# Patient Record
Sex: MALE | Race: BLACK OR AFRICAN AMERICAN | NOT HISPANIC OR LATINO | Employment: FULL TIME | URBAN - METROPOLITAN AREA
[De-identification: names, ages, dates, MRNs, and addresses within clinical notes are randomized per-mention and may not be internally consistent; named-entity substitution may affect disease eponyms.]

---

## 2023-03-21 PROBLEM — R05.9 COUGH: Status: ACTIVE | Noted: 2023-03-21

## 2023-03-21 PROBLEM — E66.01 CLASS 3 SEVERE OBESITY DUE TO EXCESS CALORIES IN ADULT (MULTI): Status: ACTIVE | Noted: 2023-03-21

## 2023-03-21 PROBLEM — M54.2 CERVICALGIA: Status: ACTIVE | Noted: 2023-03-21

## 2023-03-21 PROBLEM — E11.9 TYPE 2 DIABETES MELLITUS (MULTI): Status: ACTIVE | Noted: 2023-03-21

## 2023-03-21 PROBLEM — K56.609 SMALL BOWEL OBSTRUCTION (MULTI): Status: ACTIVE | Noted: 2023-03-21

## 2023-03-21 PROBLEM — M72.2 PLANTAR FASCIITIS, RIGHT: Status: ACTIVE | Noted: 2023-03-21

## 2023-03-21 PROBLEM — M25.532 WRIST PAIN, CHRONIC, LEFT: Status: ACTIVE | Noted: 2023-03-21

## 2023-03-21 PROBLEM — G47.30 SLEEP APNEA: Status: ACTIVE | Noted: 2023-03-21

## 2023-03-21 PROBLEM — J30.9 ALLERGIC RHINITIS: Status: ACTIVE | Noted: 2023-03-21

## 2023-03-21 PROBLEM — J38.7 LARYNGEAL CYST: Status: ACTIVE | Noted: 2023-03-21

## 2023-03-21 PROBLEM — E55.9 VITAMIN D DEFICIENCY: Status: ACTIVE | Noted: 2023-03-21

## 2023-03-21 PROBLEM — R19.7 NAUSEA VOMITING AND DIARRHEA: Status: ACTIVE | Noted: 2023-03-21

## 2023-03-21 PROBLEM — R60.9 EDEMA: Status: ACTIVE | Noted: 2023-03-21

## 2023-03-21 PROBLEM — G89.29 WRIST PAIN, CHRONIC, LEFT: Status: ACTIVE | Noted: 2023-03-21

## 2023-03-21 PROBLEM — N47.6 BALANOPOSTHITIS: Status: ACTIVE | Noted: 2023-03-21

## 2023-03-21 PROBLEM — R22.1 NECK SWELLING: Status: ACTIVE | Noted: 2023-03-21

## 2023-03-21 PROBLEM — I10 HYPERTENSION: Status: ACTIVE | Noted: 2023-03-21

## 2023-03-21 PROBLEM — E66.813 CLASS 3 SEVERE OBESITY DUE TO EXCESS CALORIES IN ADULT: Status: ACTIVE | Noted: 2023-03-21

## 2023-03-21 PROBLEM — E11.9 DIABETES MELLITUS (MULTI): Status: ACTIVE | Noted: 2023-03-21

## 2023-03-21 PROBLEM — U07.1 COVID-19 VIRUS INFECTION: Status: ACTIVE | Noted: 2023-03-21

## 2023-03-21 PROBLEM — E66.09 EXOGENOUS OBESITY: Status: ACTIVE | Noted: 2023-03-21

## 2023-03-21 PROBLEM — G47.33 OBSTRUCTIVE SLEEP APNEA: Status: ACTIVE | Noted: 2023-03-21

## 2023-03-21 PROBLEM — M21.40 PES PLANUS: Status: ACTIVE | Noted: 2023-03-21

## 2023-03-21 PROBLEM — R21 RASH: Status: ACTIVE | Noted: 2023-03-21

## 2023-03-21 PROBLEM — E78.5 DYSLIPIDEMIA: Status: ACTIVE | Noted: 2023-03-21

## 2023-03-21 PROBLEM — R93.2 ABNORMAL CT OF LIVER: Status: ACTIVE | Noted: 2023-03-21

## 2023-03-21 PROBLEM — R11.2 NAUSEA VOMITING AND DIARRHEA: Status: ACTIVE | Noted: 2023-03-21

## 2023-03-21 RX ORDER — TRIAMTERENE AND HYDROCHLOROTHIAZIDE 37.5; 25 MG/1; MG/1
1 CAPSULE ORAL DAILY
COMMUNITY
Start: 2020-12-15 | End: 2023-04-03 | Stop reason: SDUPTHER

## 2023-03-21 RX ORDER — DULAGLUTIDE 4.5 MG/.5ML
INJECTION, SOLUTION SUBCUTANEOUS
COMMUNITY
Start: 2022-07-28 | End: 2023-04-03 | Stop reason: SDUPTHER

## 2023-03-21 RX ORDER — PEN NEEDLE, DIABETIC 31 GX5/16"
NEEDLE, DISPOSABLE MISCELLANEOUS
COMMUNITY
Start: 2022-10-24 | End: 2023-04-03 | Stop reason: SDUPTHER

## 2023-03-21 RX ORDER — CHOLECALCIFEROL (VITAMIN D3) 25 MCG
1 TABLET ORAL DAILY
COMMUNITY
Start: 2020-02-15 | End: 2023-08-03 | Stop reason: SDUPTHER

## 2023-03-21 RX ORDER — INSULIN GLARGINE-YFGN 100 [IU]/ML
30 INJECTION, SOLUTION SUBCUTANEOUS DAILY
COMMUNITY
Start: 2022-10-24 | End: 2023-04-03 | Stop reason: SDUPTHER

## 2023-03-21 RX ORDER — DAPAGLIFLOZIN 10 MG/1
1 TABLET, FILM COATED ORAL DAILY
COMMUNITY
Start: 2021-01-19 | End: 2023-04-03 | Stop reason: SDUPTHER

## 2023-03-27 ENCOUNTER — APPOINTMENT (OUTPATIENT)
Dept: PRIMARY CARE | Facility: CLINIC | Age: 38
End: 2023-03-27
Payer: COMMERCIAL

## 2023-04-03 ENCOUNTER — OFFICE VISIT (OUTPATIENT)
Dept: PRIMARY CARE | Facility: CLINIC | Age: 38
End: 2023-04-03
Payer: COMMERCIAL

## 2023-04-03 VITALS
BODY MASS INDEX: 39.8 KG/M2 | TEMPERATURE: 95.7 F | HEIGHT: 70 IN | DIASTOLIC BLOOD PRESSURE: 80 MMHG | SYSTOLIC BLOOD PRESSURE: 120 MMHG | WEIGHT: 278 LBS

## 2023-04-03 DIAGNOSIS — G47.33 OBSTRUCTIVE SLEEP APNEA: ICD-10-CM

## 2023-04-03 DIAGNOSIS — E11.9 TYPE 2 DIABETES MELLITUS WITHOUT COMPLICATION, WITH LONG-TERM CURRENT USE OF INSULIN (MULTI): Primary | ICD-10-CM

## 2023-04-03 DIAGNOSIS — I10 PRIMARY HYPERTENSION: ICD-10-CM

## 2023-04-03 DIAGNOSIS — Z79.4 TYPE 2 DIABETES MELLITUS WITHOUT COMPLICATION, WITH LONG-TERM CURRENT USE OF INSULIN (MULTI): Primary | ICD-10-CM

## 2023-04-03 DIAGNOSIS — E78.5 DYSLIPIDEMIA: ICD-10-CM

## 2023-04-03 PROBLEM — M25.532 WRIST PAIN, CHRONIC, LEFT: Status: RESOLVED | Noted: 2023-03-21 | Resolved: 2023-04-03

## 2023-04-03 PROBLEM — R11.2 NAUSEA VOMITING AND DIARRHEA: Status: RESOLVED | Noted: 2023-03-21 | Resolved: 2023-04-03

## 2023-04-03 PROBLEM — R19.7 NAUSEA VOMITING AND DIARRHEA: Status: RESOLVED | Noted: 2023-03-21 | Resolved: 2023-04-03

## 2023-04-03 PROBLEM — R60.9 EDEMA: Status: RESOLVED | Noted: 2023-03-21 | Resolved: 2023-04-03

## 2023-04-03 PROBLEM — G89.29 WRIST PAIN, CHRONIC, LEFT: Status: RESOLVED | Noted: 2023-03-21 | Resolved: 2023-04-03

## 2023-04-03 PROBLEM — G47.30 SLEEP APNEA: Status: RESOLVED | Noted: 2023-03-21 | Resolved: 2023-04-03

## 2023-04-03 PROBLEM — R21 RASH: Status: RESOLVED | Noted: 2023-03-21 | Resolved: 2023-04-03

## 2023-04-03 PROBLEM — J30.9 ALLERGIC RHINITIS: Status: RESOLVED | Noted: 2023-03-21 | Resolved: 2023-04-03

## 2023-04-03 PROBLEM — K56.609 SMALL BOWEL OBSTRUCTION (MULTI): Status: RESOLVED | Noted: 2023-03-21 | Resolved: 2023-04-03

## 2023-04-03 PROBLEM — R05.9 COUGH: Status: RESOLVED | Noted: 2023-03-21 | Resolved: 2023-04-03

## 2023-04-03 PROBLEM — E66.01 CLASS 3 SEVERE OBESITY DUE TO EXCESS CALORIES IN ADULT (MULTI): Status: RESOLVED | Noted: 2023-03-21 | Resolved: 2023-04-03

## 2023-04-03 PROBLEM — E66.813 CLASS 3 SEVERE OBESITY DUE TO EXCESS CALORIES IN ADULT: Status: RESOLVED | Noted: 2023-03-21 | Resolved: 2023-04-03

## 2023-04-03 PROBLEM — R22.1 NECK SWELLING: Status: RESOLVED | Noted: 2023-03-21 | Resolved: 2023-04-03

## 2023-04-03 PROBLEM — E66.09 EXOGENOUS OBESITY: Status: RESOLVED | Noted: 2023-03-21 | Resolved: 2023-04-03

## 2023-04-03 PROBLEM — M54.2 CERVICALGIA: Status: RESOLVED | Noted: 2023-03-21 | Resolved: 2023-04-03

## 2023-04-03 PROBLEM — U07.1 COVID-19 VIRUS INFECTION: Status: RESOLVED | Noted: 2023-03-21 | Resolved: 2023-04-03

## 2023-04-03 PROCEDURE — 1036F TOBACCO NON-USER: CPT

## 2023-04-03 PROCEDURE — 3079F DIAST BP 80-89 MM HG: CPT

## 2023-04-03 PROCEDURE — 99214 OFFICE O/P EST MOD 30 MIN: CPT

## 2023-04-03 PROCEDURE — 3074F SYST BP LT 130 MM HG: CPT

## 2023-04-03 RX ORDER — PEN NEEDLE, DIABETIC 31 GX5/16"
1 NEEDLE, DISPOSABLE MISCELLANEOUS NIGHTLY
Qty: 90 EACH | Refills: 0 | Status: SHIPPED | OUTPATIENT
Start: 2023-04-03 | End: 2023-06-19 | Stop reason: SDUPTHER

## 2023-04-03 RX ORDER — FLASH GLUCOSE SENSOR
KIT MISCELLANEOUS
Qty: 2 EACH | Refills: 2 | Status: SHIPPED | OUTPATIENT
Start: 2023-04-03 | End: 2023-06-19 | Stop reason: SDUPTHER

## 2023-04-03 RX ORDER — DAPAGLIFLOZIN 10 MG/1
10 TABLET, FILM COATED ORAL DAILY
Qty: 90 TABLET | Refills: 0 | Status: SHIPPED | OUTPATIENT
Start: 2023-04-03 | End: 2023-06-19 | Stop reason: SDUPTHER

## 2023-04-03 RX ORDER — TRIAMTERENE AND HYDROCHLOROTHIAZIDE 37.5; 25 MG/1; MG/1
1 CAPSULE ORAL DAILY
Qty: 90 CAPSULE | Refills: 0 | Status: SHIPPED | OUTPATIENT
Start: 2023-04-03 | End: 2023-06-19 | Stop reason: SDUPTHER

## 2023-04-03 RX ORDER — ISOPROPYL ALCOHOL 70 ML/100ML
1 SWAB TOPICAL 3 TIMES DAILY
Qty: 100 EACH | Refills: 2 | Status: SHIPPED | OUTPATIENT
Start: 2023-04-03 | End: 2023-06-19 | Stop reason: SDUPTHER

## 2023-04-03 RX ORDER — DULAGLUTIDE 4.5 MG/.5ML
4.5 INJECTION, SOLUTION SUBCUTANEOUS
Qty: 12 PEN | Refills: 0 | Status: SHIPPED | OUTPATIENT
Start: 2023-04-03 | End: 2023-06-19 | Stop reason: SDUPTHER

## 2023-04-03 RX ORDER — INSULIN GLARGINE-YFGN 100 [IU]/ML
48 INJECTION, SOLUTION SUBCUTANEOUS NIGHTLY
Qty: 9 ML | Refills: 2 | Status: SHIPPED | OUTPATIENT
Start: 2023-04-03 | End: 2023-04-25

## 2023-04-03 NOTE — PROGRESS NOTES
"Subjective   Patient ID: Darshan Barroso is a 38 y.o. male who presents for Follow-up.  HPI  DM: not checking BP at home, feels he has been slacking off recently. Did not have labs drawn after last appointment, due today.   HTN: well controlled currently   PETE: not using CPAP since cancer causing study, will send referral to sleep medicine.   HLD: not on statin, will recheck today.     All systems have been reviewed and are negative for complaint other than those mentioned in the HPI.     Objective   /80 (BP Location: Left arm, Patient Position: Sitting, BP Cuff Size: Large adult)   Temp 35.4 °C (95.7 °F) (Temporal)   Ht 1.778 m (5' 10\")   Wt 126 kg (278 lb)   BMI 39.89 kg/m²    Physical Exam  Constitutional:       General: He is awake.      Appearance: Normal appearance.   HENT:      Head: Normocephalic and atraumatic.   Eyes:      Extraocular Movements: Extraocular movements intact.      Pupils: Pupils are equal, round, and reactive to light.   Cardiovascular:      Rate and Rhythm: Normal rate and regular rhythm.      Heart sounds: S1 normal and S2 normal. No murmur heard.  Pulmonary:      Effort: Pulmonary effort is normal.      Breath sounds: Normal breath sounds.   Musculoskeletal:      Cervical back: Normal range of motion and neck supple.      Right lower leg: No edema.      Left lower leg: No edema.   Skin:     General: Skin is warm and dry.   Neurological:      General: No focal deficit present.      Mental Status: He is alert and oriented to person, place, and time.   Psychiatric:         Mood and Affect: Mood and affect normal.         Behavior: Behavior normal. Behavior is cooperative.         Thought Content: Thought content normal.         Judgment: Judgment normal.     Darshan was seen today for follow-up.  Diagnoses and all orders for this visit:  Type 2 diabetes mellitus without complication, with long-term current use of insulin (CMS/[facility]) (Primary)  -     Not checking BG at home, will " "refill Susan. Patient compliant when has CGM. Due for A1C today.   - Currently on Farxiga, Semglee 48U nightly, Trulicity 4.5mg weekly. Continue current meds.   - Due for podiatry/optho follow up  - Would benefit from nutrition refresher.   - Hemoglobin A1C; Future  -     Comprehensive Metabolic Panel; Future  -     TSH with reflex to Free T4 if abnormal; Future  -     FreeStyle Susan sensor system (FreeStyle Susan 2 Sensor) kit; Change sensor every 14 days as directed  -     dulaglutide (Trulicity) 4.5 mg/0.5 mL pen injector; Inject 4.5 mg under the skin 1 (one) time per week. Inject yourself once a week every Thursday  -     Semglee,insulin glarg-yfgn,Pen 100 unit/mL (3 mL) insulin pen; Inject 48 Units under the skin once daily at bedtime.  -     dapagliflozin (Farxiga) 10 mg; Take 1 tablet (10 mg) by mouth once daily.  -     alcohol swabs (Alcohol Pads) pads, medicated; Apply 1 Pad topically in the morning and 1 Pad in the evening and 1 Pad before bedtime.  -     BD Ultra-Fine Mini Pen Needle 31 gauge x 3/16\" needle; Inject 1 each as directed once daily at bedtime. Use with lantus solarstar  -     Referral to Nutrition Services; Future  -     Referral to Podiatry; Future  -     Referral to Ophthalmology; Future  Primary hypertension  -    Stable, continue current meds  -  CBC; Future  -     triamterene-hydrochlorothiazid (Dyazide) 37.5-25 mg capsule; Take 1 capsule by mouth once daily.  Obstructive sleep apnea  -     Stopped using CPAP when machines were recalled, has lost weight, reevaluate.   - Referral to Adult Sleep Medicine; Future  Dyslipidemia  -     Diet controlled, recheck today.   - Lipid Panel; Future  Other orders  -     Follow Up In Primary Care; Future    Follow up in 3 months.   "

## 2023-04-17 DIAGNOSIS — Z79.4 TYPE 2 DIABETES MELLITUS WITHOUT COMPLICATION, WITH LONG-TERM CURRENT USE OF INSULIN (MULTI): ICD-10-CM

## 2023-04-17 DIAGNOSIS — E11.9 TYPE 2 DIABETES MELLITUS WITHOUT COMPLICATION, WITH LONG-TERM CURRENT USE OF INSULIN (MULTI): ICD-10-CM

## 2023-04-19 RX ORDER — FLASH GLUCOSE SCANNING READER
1 EACH MISCELLANEOUS
Qty: 2 EACH | Refills: 3 | Status: SHIPPED | OUTPATIENT
Start: 2023-04-19 | End: 2024-05-29 | Stop reason: WASHOUT

## 2023-04-19 RX ORDER — FLASH GLUCOSE SCANNING READER
1 EACH MISCELLANEOUS
COMMUNITY
End: 2023-04-19 | Stop reason: SDUPTHER

## 2023-04-25 ENCOUNTER — DOCUMENTATION (OUTPATIENT)
Dept: PRIMARY CARE | Facility: CLINIC | Age: 38
End: 2023-04-25
Payer: COMMERCIAL

## 2023-04-25 DIAGNOSIS — E11.9 TYPE 2 DIABETES MELLITUS WITHOUT COMPLICATION, WITH LONG-TERM CURRENT USE OF INSULIN (MULTI): Primary | ICD-10-CM

## 2023-04-25 DIAGNOSIS — Z79.4 TYPE 2 DIABETES MELLITUS WITHOUT COMPLICATION, WITH LONG-TERM CURRENT USE OF INSULIN (MULTI): Primary | ICD-10-CM

## 2023-04-25 RX ORDER — INSULIN GLARGINE 300 [IU]/ML
48 INJECTION, SOLUTION SUBCUTANEOUS NIGHTLY
Qty: 5 EACH | Refills: 3 | Status: SHIPPED | OUTPATIENT
Start: 2023-04-25 | End: 2023-06-19 | Stop reason: ALTCHOICE

## 2023-05-15 ENCOUNTER — LAB (OUTPATIENT)
Dept: LAB | Facility: LAB | Age: 38
End: 2023-05-15
Payer: COMMERCIAL

## 2023-05-15 DIAGNOSIS — Z79.4 TYPE 2 DIABETES MELLITUS WITHOUT COMPLICATION, WITH LONG-TERM CURRENT USE OF INSULIN (MULTI): ICD-10-CM

## 2023-05-15 DIAGNOSIS — E78.5 DYSLIPIDEMIA: ICD-10-CM

## 2023-05-15 DIAGNOSIS — E11.9 TYPE 2 DIABETES MELLITUS WITHOUT COMPLICATION, WITH LONG-TERM CURRENT USE OF INSULIN (MULTI): ICD-10-CM

## 2023-05-15 DIAGNOSIS — I10 PRIMARY HYPERTENSION: ICD-10-CM

## 2023-05-15 LAB
ALANINE AMINOTRANSFERASE (SGPT) (U/L) IN SER/PLAS: 35 U/L (ref 10–52)
ALBUMIN (G/DL) IN SER/PLAS: 4.5 G/DL (ref 3.4–5)
ALKALINE PHOSPHATASE (U/L) IN SER/PLAS: 94 U/L (ref 33–120)
ANION GAP IN SER/PLAS: 12 MMOL/L (ref 10–20)
ASPARTATE AMINOTRANSFERASE (SGOT) (U/L) IN SER/PLAS: 21 U/L (ref 9–39)
BILIRUBIN TOTAL (MG/DL) IN SER/PLAS: 0.4 MG/DL (ref 0–1.2)
CALCIUM (MG/DL) IN SER/PLAS: 9.5 MG/DL (ref 8.6–10.6)
CARBON DIOXIDE, TOTAL (MMOL/L) IN SER/PLAS: 31 MMOL/L (ref 21–32)
CHLORIDE (MMOL/L) IN SER/PLAS: 103 MMOL/L (ref 98–107)
CHOLESTEROL (MG/DL) IN SER/PLAS: 124 MG/DL (ref 0–199)
CHOLESTEROL IN HDL (MG/DL) IN SER/PLAS: 40.1 MG/DL
CHOLESTEROL/HDL RATIO: 3.1
CREATININE (MG/DL) IN SER/PLAS: 0.93 MG/DL (ref 0.5–1.3)
ERYTHROCYTE DISTRIBUTION WIDTH (RATIO) BY AUTOMATED COUNT: 14.2 % (ref 11.5–14.5)
ERYTHROCYTE MEAN CORPUSCULAR HEMOGLOBIN CONCENTRATION (G/DL) BY AUTOMATED: 31.4 G/DL (ref 32–36)
ERYTHROCYTE MEAN CORPUSCULAR VOLUME (FL) BY AUTOMATED COUNT: 85 FL (ref 80–100)
ERYTHROCYTES (10*6/UL) IN BLOOD BY AUTOMATED COUNT: 5.7 X10E12/L (ref 4.5–5.9)
ESTIMATED AVERAGE GLUCOSE FOR HBA1C: 183 MG/DL
GFR MALE: >90 ML/MIN/1.73M2
GLUCOSE (MG/DL) IN SER/PLAS: 144 MG/DL (ref 74–99)
HEMATOCRIT (%) IN BLOOD BY AUTOMATED COUNT: 48.4 % (ref 41–52)
HEMOGLOBIN (G/DL) IN BLOOD: 15.2 G/DL (ref 13.5–17.5)
HEMOGLOBIN A1C/HEMOGLOBIN TOTAL IN BLOOD: 8 %
LDL: 52 MG/DL (ref 0–99)
LEUKOCYTES (10*3/UL) IN BLOOD BY AUTOMATED COUNT: 6.6 X10E9/L (ref 4.4–11.3)
NRBC (PER 100 WBCS) BY AUTOMATED COUNT: 0 /100 WBC (ref 0–0)
PLATELETS (10*3/UL) IN BLOOD AUTOMATED COUNT: 240 X10E9/L (ref 150–450)
POTASSIUM (MMOL/L) IN SER/PLAS: 4.5 MMOL/L (ref 3.5–5.3)
PROTEIN TOTAL: 7.3 G/DL (ref 6.4–8.2)
SODIUM (MMOL/L) IN SER/PLAS: 141 MMOL/L (ref 136–145)
THYROTROPIN (MIU/L) IN SER/PLAS BY DETECTION LIMIT <= 0.05 MIU/L: 2.43 MIU/L (ref 0.44–3.98)
TRIGLYCERIDE (MG/DL) IN SER/PLAS: 160 MG/DL (ref 0–149)
UREA NITROGEN (MG/DL) IN SER/PLAS: 12 MG/DL (ref 6–23)
VLDL: 32 MG/DL (ref 0–40)

## 2023-05-15 PROCEDURE — 84443 ASSAY THYROID STIM HORMONE: CPT

## 2023-05-15 PROCEDURE — 36415 COLL VENOUS BLD VENIPUNCTURE: CPT

## 2023-05-15 PROCEDURE — 80053 COMPREHEN METABOLIC PANEL: CPT

## 2023-05-15 PROCEDURE — 83036 HEMOGLOBIN GLYCOSYLATED A1C: CPT

## 2023-05-15 PROCEDURE — 85027 COMPLETE CBC AUTOMATED: CPT

## 2023-05-15 PROCEDURE — 80061 LIPID PANEL: CPT

## 2023-06-19 ENCOUNTER — TELEMEDICINE (OUTPATIENT)
Dept: PHARMACY | Facility: HOSPITAL | Age: 38
End: 2023-06-19
Payer: COMMERCIAL

## 2023-06-19 ENCOUNTER — E-VISIT (OUTPATIENT)
Dept: PHARMACY | Facility: HOSPITAL | Age: 38
End: 2023-06-19

## 2023-06-19 DIAGNOSIS — E11.9 TYPE 2 DIABETES MELLITUS WITHOUT COMPLICATION, WITH LONG-TERM CURRENT USE OF INSULIN (MULTI): ICD-10-CM

## 2023-06-19 DIAGNOSIS — Z79.4 TYPE 2 DIABETES MELLITUS WITHOUT COMPLICATION, WITH LONG-TERM CURRENT USE OF INSULIN (MULTI): ICD-10-CM

## 2023-06-19 DIAGNOSIS — I10 PRIMARY HYPERTENSION: ICD-10-CM

## 2023-06-19 RX ORDER — INSULIN GLARGINE-YFGN 100 [IU]/ML
50 INJECTION, SOLUTION SUBCUTANEOUS NIGHTLY
Qty: 15 ML | Refills: 3 | Status: SHIPPED | OUTPATIENT
Start: 2023-06-19 | End: 2023-06-20

## 2023-06-19 RX ORDER — FLASH GLUCOSE SENSOR
KIT MISCELLANEOUS
Qty: 2 EACH | Refills: 2 | Status: SHIPPED | OUTPATIENT
Start: 2023-06-19 | End: 2023-07-05 | Stop reason: SDUPTHER

## 2023-06-19 RX ORDER — DULAGLUTIDE 4.5 MG/.5ML
4.5 INJECTION, SOLUTION SUBCUTANEOUS
Qty: 2 PEN | Refills: 3 | Status: SHIPPED | OUTPATIENT
Start: 2023-06-19 | End: 2023-08-03 | Stop reason: SDUPTHER

## 2023-06-19 RX ORDER — INSULIN GLARGINE-YFGN 100 [IU]/ML
48 INJECTION, SOLUTION SUBCUTANEOUS NIGHTLY
COMMUNITY
End: 2023-06-19 | Stop reason: SDUPTHER

## 2023-06-19 RX ORDER — TRIAMTERENE AND HYDROCHLOROTHIAZIDE 37.5; 25 MG/1; MG/1
1 CAPSULE ORAL DAILY
Qty: 90 CAPSULE | Refills: 0 | Status: SHIPPED | OUTPATIENT
Start: 2023-06-19 | End: 2023-08-03 | Stop reason: SDUPTHER

## 2023-06-19 RX ORDER — DAPAGLIFLOZIN 10 MG/1
10 TABLET, FILM COATED ORAL DAILY
Qty: 90 TABLET | Refills: 0 | Status: SHIPPED | OUTPATIENT
Start: 2023-06-19 | End: 2023-08-03 | Stop reason: SDUPTHER

## 2023-06-19 RX ORDER — ISOPROPYL ALCOHOL 70 ML/100ML
1 SWAB TOPICAL 3 TIMES DAILY
Qty: 100 EACH | Refills: 2 | Status: SHIPPED | OUTPATIENT
Start: 2023-06-19 | End: 2023-08-09 | Stop reason: SDUPTHER

## 2023-06-19 RX ORDER — PEN NEEDLE, DIABETIC 31 GX5/16"
NEEDLE, DISPOSABLE MISCELLANEOUS
Qty: 90 EACH | Refills: 3 | Status: SHIPPED | OUTPATIENT
Start: 2023-06-19 | End: 2023-11-13 | Stop reason: SDUPTHER

## 2023-06-19 NOTE — PROGRESS NOTES
Patient is sent at the request of Thalia Hurd APRN-CNP for my opinion regarding Type 2 diabetes.  My final recommendations will be communicated back to the requesting provider by way of shared medical record.    Subjective      Darshan Barroso is a 38 y.o. male who presents for initial visit for evaluation of Type 2 diabetes mellitus. The patient does have a known family history of diabetes.    HPI     Allergies   Allergen Reactions    Bee Pollen Unknown    Canagliflozin Unknown     Pt went to hospital with ketoacidosis from this Rx    Metformin Diarrhea    Phenylephrine-Guaifenesin Unknown     Lightheadedness     Known complications due to diabetes included obesity    Cardiovascular risk factors include diabetes mellitus, hypertension, male gender, and obesity (BMI >= 30 kg/m2). The patient is not on an ACE inhibitor or angiotensin II receptor blocker.  The patient has been previously hospitalized due to diabetic ketoacidosis.     Current symptoms/problems include none. His clinical course has improved.     Current diabetes regimen is as follows:   Insulin Glargine 100U/mL inject 48 units under the skin once a day at night  Farxiga 10mg take 1 tablet by mouth once a day  Trulicity 4.5mg/0.5mL inect 4.5mg under the skin once a week on Thursdays    Past Diabetic Medication History:  Invokana - In 2015, patient was hospitalized with diabetic ketoacidosis (most likely due to medication)  Metformin - Patient reports diarrhea and upset stomach    The patient is currently checking the blood glucose throughout the day and using a continuous glucose monitor. Patient currently states that his sugars run anywhere between 140-150mg/dL and he has had some high blood sugars (>200mg/dL). Patient states that he has not had any blood sugars below 100mg/dL.    Hypoglycemia frequency: None reported  Hypoglycemia awareness: Yes     Diet   Patient states that he eats 2-3 meals per day, sometimes skipping breakfast. He typically  cooks for himself (chicken, fish, etc.) rather than going out to eat. He does not always snack, but will sometimes have chips. When asked about drinks throughout the day, patient states that 75% of the time he drinks mostly water, but sometimes he craves a pop.  Exercise   Patient states he is not consistent with his exercise. He gets about 10,500 steps each day because he is moving around at his second job a lot. He also tries to go to the gym about once a week. Patient states he has kids who keep him busy and moving around. He has lost about 40lbs since he has been working at decreasing his blood sugar.     DRUG INTERACTIONS   No significant drug-drug interactions exist that require change in therapy.    Review of Systems    Objective      There were no vitals taken for this visit.    Pertinent PMH Review:  - PMH of Pancreatitis: No  - PMH of Retinopathy: No  - PMH of Urinary Tract Infections: No    Lab Review  Lab Results   Component Value Date    BILITOT 0.4 05/15/2023    CALCIUM 9.5 05/15/2023    CO2 31 05/15/2023     05/15/2023    CREATININE 0.93 05/15/2023    GLUCOSE 144 (H) 05/15/2023    ALKPHOS 94 05/15/2023    K 4.5 05/15/2023    PROT 7.3 05/15/2023     05/15/2023    AST 21 05/15/2023    ALT 35 05/15/2023    BUN 12 05/15/2023    ANIONGAP 12 05/15/2023    MG 1.90 12/27/2021    PHOS 4.1 01/14/2021    ALBUMIN 4.5 05/15/2023    LIPASE 49 12/26/2021    GFRMALE >90 05/15/2023     Lab Results   Component Value Date    TRIG 160 (H) 05/15/2023    CHOL 124 05/15/2023    HDL 40.1 05/15/2023     Lab Results   Component Value Date    HGBA1C 8.0 (A) 05/15/2023     The ASCVD Risk score (Tadeo SALINAS, et al., 2019) failed to calculate for the following reasons:    The 2019 ASCVD risk score is only valid for ages 40 to 79    Health Maintenance:   Foot Exam: Patient does not currently see a podiatrist or check his feet, we discussed the importance  Eye Exam: Patient states he goes to the eye doctor yearly, and just  went 1-2 months ago  Lipid Panel: Up to date    Assessment/Plan      Diagnoses and all orders for this visit:  Type 2 diabetes mellitus without complication, with long-term current use of insulin (CMS/[facility])  -     Referral to Clinical Pharmacy    Diabetes Education  Rule of 15: eating ~15 g of carbs when BG less than 80 (half cup juice, 3-4 glucose tabs).  Recognize symptoms of high and low blood sugar.   Eat a realistic healthy diet consisting of fruits, vegetables, fiber, protein food choices on a regular basis and be aware of portion/serving sizes. Reduce carbohydrate consumption and always consume with protein and fat. Avoid foods high in saturated/trans fat, high salt content, and sweets and beverages with added sugars.  Limit alcohol consumption; alcohol may affect your blood sugar and cause hypoglycemia.   Stay active and incorporate ~30 mins of exercise into your daily routine to manage your weight and increase the body's acceptance of insulin.    PATIENT GOALS   Fasting B - 130 mg/dL 2-HR Postprandial BG:   Less than 180 mg/dL A1c:   Less than 7.0 %     Type 2 diabetes mellitus, is not at goal as evidenced by most recent A1c of 8.0% on 5/15/2023.    RX changes:   INCREASE Semglee Insulin to 50 units once daily at night   Education:  interpretation of lab results, blood sugar goals, complications of diabetes mellitus, exercise, and nutrition  Compliance at present is estimated to be good. Efforts to improve compliance (if necessary) will be directed at dietary modifications: work on filling the plate with half vegetables, a quarter of a lean protein, and a quarter with a carb or starch; smaller portions; working on being aware of how many carbohydrates are in the foods the patient eats and how many servings of that food he is having; and increased exercise.  Patient requested refills on his medications. Will send over Alcohol Swabs, Needles, FreeStyle Sensors, Farxiga, Trulicity, and Insulin to [facility].  Follow up: I recommend diabetes care be 1 month on 7/10/2023.  Patient is to share his FreeStyle Susan Account with the Clinical Pharmacy Team to have a better understanding of how his blood sugars run throughout the day.  Consider changing Trulicity 4.5mg/0.5mL to Mounjaro 5mg/0.5mL and titrating up as tolerated. Patient can receive better glycemic control from Mounjaro as well as benefit from the weight loss. Mounjaro will need a prior authorization through insurance.   If increased dose of insulin is needed, consider switching to Toujeo U300 to reduce the volume of insulin that the patient is injecting daily.  Patient states that he does not like to take a lot of medications. Will focus on diet/lifestyle. Goal is to go to the gym 2 times per week and increase as he gets into a routine.     Continue all meds under the continuation of care with the referring provider and clinical pharmacy team.    Please reach out to the Clinical Pharmacy Team if there are any further questions.     Verbal consent to manage patient's drug therapy was obtained from patient. They were informed they may decline to participate or withdraw from participation in pharmacy services at any time.    Soledad Alvarez, PharmD   Meds PGY1 Pharmacy Resident   [phone]

## 2023-06-19 NOTE — Clinical Note
Hello,   I spoke with this patient today in regards to his diabetes. We reviewed ideal blood sugar and A1c goals, as well as discussed a plan of action. The patient is going to share his FreeStyle Susan account with me so that I can see how his blood sugars are running. We discussed a lot about diet/lifestyle to help him get his blood sugars in range as well. He stated that he does not like a lot of medication changes, so working on diet/lifestyle for a few weeks will be what is best for the patient.  Darshan also requested refills on all his medications, so those were sent to his preferred [facility].  Thank you,  Soledad Alvarez, PharmD

## 2023-06-20 DIAGNOSIS — Z79.4 TYPE 2 DIABETES MELLITUS WITHOUT COMPLICATION, WITH LONG-TERM CURRENT USE OF INSULIN (MULTI): Primary | ICD-10-CM

## 2023-06-20 DIAGNOSIS — E11.9 TYPE 2 DIABETES MELLITUS WITHOUT COMPLICATION, WITH LONG-TERM CURRENT USE OF INSULIN (MULTI): Primary | ICD-10-CM

## 2023-06-20 RX ORDER — INSULIN GLARGINE 100 [IU]/ML
50 INJECTION, SOLUTION SUBCUTANEOUS NIGHTLY
Qty: 5 EACH | Refills: 2 | Status: SHIPPED | OUTPATIENT
Start: 2023-06-20 | End: 2023-08-09 | Stop reason: SDUPTHER

## 2023-06-27 NOTE — PROGRESS NOTES
I reviewed the progress note and agree with the resident’s findings and plans as written. Case discussed with resident.    Aashish Ryan, PharmD

## 2023-07-03 ENCOUNTER — APPOINTMENT (OUTPATIENT)
Dept: PRIMARY CARE | Facility: CLINIC | Age: 38
End: 2023-07-03
Payer: COMMERCIAL

## 2023-07-05 ENCOUNTER — TELEPHONE (OUTPATIENT)
Dept: PHARMACY | Facility: HOSPITAL | Age: 38
End: 2023-07-05
Payer: COMMERCIAL

## 2023-07-05 DIAGNOSIS — Z79.4 TYPE 2 DIABETES MELLITUS WITHOUT COMPLICATION, WITH LONG-TERM CURRENT USE OF INSULIN (MULTI): ICD-10-CM

## 2023-07-05 DIAGNOSIS — E11.9 TYPE 2 DIABETES MELLITUS WITHOUT COMPLICATION, WITH LONG-TERM CURRENT USE OF INSULIN (MULTI): ICD-10-CM

## 2023-07-05 RX ORDER — FLASH GLUCOSE SENSOR
KIT MISCELLANEOUS
Qty: 2 EACH | Refills: 3 | Status: SHIPPED | OUTPATIENT
Start: 2023-07-05 | End: 2023-08-03 | Stop reason: SDUPTHER

## 2023-07-05 NOTE — TELEPHONE ENCOUNTER
Darshan Barroso is a 38 year old male who was referred to the Clinical Pharmacy Team for diabetes management. The patient requested refills on FreeStyle Susan Sensors. Will send to [facility] for the patient to . Follow up is scheduled for the patient with the pharmacy team on 7/10/2023 at 2pm.    Continue all meds under the continuation of care with the referring provider and clinical pharmacy team.    Please reach out to the Clinical Pharmacy Team if there are any further questions.     Soledad Alvarez, PharmD  [phone]

## 2023-07-10 ENCOUNTER — APPOINTMENT (OUTPATIENT)
Dept: PHARMACY | Facility: HOSPITAL | Age: 38
End: 2023-07-10
Payer: COMMERCIAL

## 2023-07-21 ENCOUNTER — APPOINTMENT (OUTPATIENT)
Dept: PHARMACY | Facility: HOSPITAL | Age: 38
End: 2023-07-21
Payer: COMMERCIAL

## 2023-08-03 ENCOUNTER — TELEPHONE (OUTPATIENT)
Dept: PHARMACY | Facility: HOSPITAL | Age: 38
End: 2023-08-03
Payer: COMMERCIAL

## 2023-08-03 DIAGNOSIS — Z79.4 TYPE 2 DIABETES MELLITUS WITHOUT COMPLICATION, WITH LONG-TERM CURRENT USE OF INSULIN (MULTI): ICD-10-CM

## 2023-08-03 DIAGNOSIS — E11.9 TYPE 2 DIABETES MELLITUS WITHOUT COMPLICATION, WITH LONG-TERM CURRENT USE OF INSULIN (MULTI): ICD-10-CM

## 2023-08-03 DIAGNOSIS — E55.9 VITAMIN D DEFICIENCY: Primary | ICD-10-CM

## 2023-08-03 DIAGNOSIS — I10 PRIMARY HYPERTENSION: ICD-10-CM

## 2023-08-03 RX ORDER — CHOLECALCIFEROL (VITAMIN D3) 25 MCG
25 TABLET ORAL DAILY
Qty: 90 TABLET | Refills: 0 | Status: SHIPPED | OUTPATIENT
Start: 2023-08-03 | End: 2023-11-30 | Stop reason: SDUPTHER

## 2023-08-03 RX ORDER — TRIAMTERENE AND HYDROCHLOROTHIAZIDE 37.5; 25 MG/1; MG/1
1 CAPSULE ORAL DAILY
Qty: 90 CAPSULE | Refills: 0 | Status: SHIPPED | OUTPATIENT
Start: 2023-08-03 | End: 2023-11-13 | Stop reason: SDUPTHER

## 2023-08-03 RX ORDER — DULAGLUTIDE 4.5 MG/.5ML
4.5 INJECTION, SOLUTION SUBCUTANEOUS
Qty: 2 PEN | Refills: 3 | Status: SHIPPED | OUTPATIENT
Start: 2023-08-03 | End: 2023-11-13 | Stop reason: SDUPTHER

## 2023-08-03 RX ORDER — FLASH GLUCOSE SENSOR
KIT MISCELLANEOUS
Qty: 2 EACH | Refills: 3 | Status: SHIPPED | OUTPATIENT
Start: 2023-08-03 | End: 2023-08-09 | Stop reason: SDUPTHER

## 2023-08-03 RX ORDER — DAPAGLIFLOZIN 10 MG/1
10 TABLET, FILM COATED ORAL DAILY
Qty: 90 TABLET | Refills: 0 | Status: SHIPPED | OUTPATIENT
Start: 2023-08-03 | End: 2023-11-13 | Stop reason: SDUPTHER

## 2023-08-03 RX ORDER — FLASH GLUCOSE SENSOR
KIT MISCELLANEOUS
Qty: 2 EACH | Refills: 1 | Status: SHIPPED | OUTPATIENT
Start: 2023-08-03 | End: 2023-08-09 | Stop reason: SDUPTHER

## 2023-08-03 NOTE — TELEPHONE ENCOUNTER
Darshan Barroso is a 38 year old male who was referred to the Clinical Pharmacy team for diabetes management. The patient requested refills on EndoStim Sensors. Will send to [facility] for the patient to . Reminded patient of follow up scheduled on 8/4/2023 at 4pm. Will send in one more refill, and patient will need to complete appointment with pharmacist for future refills.     Continue all meds under the continuation of care with the referring provider and clinical pharmacy team.    Please reach out to the Clinical Pharmacy Team if there are any further questions.     Soledad Alvarez, PharmD  [phone]

## 2023-08-03 NOTE — TELEPHONE ENCOUNTER
From: Darshan Barroso  Sent: 8/3/2023 10:27 AM EDT  To: Soledad Alvarez, PharmD  Subject: FreeStyle Susan    Hello I need more sensors the 1 I have on now expires tomorrow thank you.

## 2023-08-04 ENCOUNTER — TELEMEDICINE (OUTPATIENT)
Dept: PHARMACY | Facility: HOSPITAL | Age: 38
End: 2023-08-04
Payer: COMMERCIAL

## 2023-08-04 DIAGNOSIS — E11.9 TYPE 2 DIABETES MELLITUS WITHOUT COMPLICATION, WITH LONG-TERM CURRENT USE OF INSULIN (MULTI): Primary | ICD-10-CM

## 2023-08-04 DIAGNOSIS — Z79.4 TYPE 2 DIABETES MELLITUS WITHOUT COMPLICATION, WITH LONG-TERM CURRENT USE OF INSULIN (MULTI): Primary | ICD-10-CM

## 2023-08-04 NOTE — PROGRESS NOTES
Pharmacist Clinic: Diabetes Management Follow Up  8/4/23     Darshan Barroso was referred to the Clinical Pharmacy Team for their diabetes management.    Referring Provider: JUSTA Fontanez    Subjective   _______________________________________________________________________  HISTORY OF PRESENT ILLNESS  Darshan Barroso presents to Clinical Pharmacy after last visit on 6/19/23. At last visit, patient's Lantus was increased to 50 units at bedtime due to elevated BG.     Since last visit, patient has overall been doing well. He has been busy working double shifts between his two jobs; due to this he occasionally has forgotten to check his BG during the day. Patient still had some readings that were discussed during visit.     Overall, his BG has improved to within goal range of <130 in the mornings and <180 after meals. He has had several readings within  of which he felt fine.   _______________________________________________________________________  PHARMACY ASSESSMENT    - Last Opthalmology Visit: Around April 2023  - Last Podiatry Visit: Unknown; discussed importance of yearly exams  - Last PCP Visit: 4/3/23  - PMH of Pancreatitis: No  - PMH of Retinopathy: No  - PMH of Urinary Tract Infections:  No    Diet:   Patient states that he eats 2-3 meals per day, sometimes skipping breakfast. He typically cooks for himself (chicken, fish, etc.) rather than going out to eat. He does not always snack, but will sometimes have chips. When asked about drinks throughout the day, patient states that 75% of the time he drinks mostly water, but sometimes he craves a pop.     Exercise:   Patient states he is not consistent with his exercise. He gets about 10,500 steps each day because he is moving around at his second job a lot. He also tries to go to the gym about once a week. Patient states he has kids who keep him busy and moving around. He has lost about 40lbs since he has been working at decreasing his blood  "sugar.     No issues reported in regards to accessibility, affordability, adherence, adverse effects, or organization    Objective   RELEVANT LAB RESULTS  Lab Results   Component Value Date    CREATININE 0.93 05/15/2023    BUN 12 05/15/2023     05/15/2023    K 4.5 05/15/2023     05/15/2023    CO2 31 05/15/2023     Lab Results   Component Value Date    CHOL 124 05/15/2023     Lab Results   Component Value Date    HDL 40.1 05/15/2023     No results found for: \"LDLCALC\"  Lab Results   Component Value Date    TRIG 160 (H) 05/15/2023     No components found for: \"CHOLHDL\"  Lab Results   Component Value Date    HGBA1C 8.0 (A) 05/15/2023     Lab Results   Component Value Date    ALT 35 05/15/2023    AST 21 05/15/2023    ALKPHOS 94 05/15/2023    BILITOT 0.4 05/15/2023     Assessment/Plan   _______________________________________________________________________  DIABETES ASSESSMENT    CURRENT PHARMACOTHERAPY  Lantus: 50 units under the skin once a day at night  Farxiga 10m tablet by mouth once a day  Trulicity: 4.5 mg under the skin once a week on     HISTORICAL PHARMACOTHERAPY  Invokana - In , patient was hospitalized with diabetic ketoacidosis (most likely due to medication)  Metformin - Patient reports diarrhea and upset stomach    SMBG MEASUREMENTS            FBG     PPG        ASSESSMENT OF SMBG MEASUREMENTS  - Highest readin mg/dL  - Lowest readin mg/dL  - Average: 112 mg/dL    Has the patient experienced any low sugars since last contact? No  - denies symptoms of low blood glucose: sweaty, shaky, anxious, excessive hunger, confusion, lightheaded, nauseous, blurred vision  Has the patient experienced any high sugars since last contact? No  - denies symptoms of high blood glucose: excessive thirst, frequent urination, fatigue, nausea, shortness of breath, trouble concentrating  _______________________________________________________________________  PATIENT EDUCATION/GOALS  - " Fasting B - 130 mg/dL  - Postprandial BG: less than 180 mg/dL  - A1c: less than 7%.  -Discussed importance of checking BG at least once a day  _______________________________________________________________________  RECOMMENDATIONS/PLAN  DECREASE Lantus to 48 units at bedtime.  Continue to stay active and eat a healthy diet consisting of fruits, vegetables, fiber, and protein  Will follow up with patient in ~1 month    Continue all meds under the continuation of care with the referring provider and clinical pharmacy team.    Clinical Pharmacist follow-up: 23 or sooner as needed for clinical intervention.    Type of Encounter: Telephone     Jennie Cline PharmD  Clinical Ambulatory Care Pharmacist  Ph: [phone]    Verbal consent to manage patient's drug therapy was obtained from the patient. They were informed they may decline to participate or withdraw from participation in pharmacy services at any time.

## 2023-08-09 ENCOUNTER — OFFICE VISIT (OUTPATIENT)
Dept: PRIMARY CARE | Facility: CLINIC | Age: 38
End: 2023-08-09
Payer: COMMERCIAL

## 2023-08-09 ENCOUNTER — LAB (OUTPATIENT)
Dept: LAB | Facility: LAB | Age: 38
End: 2023-08-09
Payer: COMMERCIAL

## 2023-08-09 VITALS — DIASTOLIC BLOOD PRESSURE: 82 MMHG | BODY MASS INDEX: 39.6 KG/M2 | SYSTOLIC BLOOD PRESSURE: 122 MMHG | WEIGHT: 276 LBS

## 2023-08-09 DIAGNOSIS — Z79.4 TYPE 2 DIABETES MELLITUS WITHOUT COMPLICATION, WITH LONG-TERM CURRENT USE OF INSULIN (MULTI): ICD-10-CM

## 2023-08-09 DIAGNOSIS — E11.9 TYPE 2 DIABETES MELLITUS WITHOUT COMPLICATION, WITH LONG-TERM CURRENT USE OF INSULIN (MULTI): ICD-10-CM

## 2023-08-09 DIAGNOSIS — I10 PRIMARY HYPERTENSION: ICD-10-CM

## 2023-08-09 DIAGNOSIS — Z79.4 TYPE 2 DIABETES MELLITUS WITHOUT COMPLICATION, WITH LONG-TERM CURRENT USE OF INSULIN (MULTI): Primary | ICD-10-CM

## 2023-08-09 DIAGNOSIS — E78.5 DYSLIPIDEMIA: ICD-10-CM

## 2023-08-09 DIAGNOSIS — E11.9 TYPE 2 DIABETES MELLITUS WITHOUT COMPLICATION, WITH LONG-TERM CURRENT USE OF INSULIN (MULTI): Primary | ICD-10-CM

## 2023-08-09 DIAGNOSIS — B07.9 WART OF HAND: ICD-10-CM

## 2023-08-09 LAB
ALBUMIN (MG/L) IN URINE: 7.3 MG/L
ALBUMIN/CREATININE (UG/MG) IN URINE: 5 UG/MG CRT (ref 0–30)
CREATININE (MG/DL) IN URINE: 147 MG/DL (ref 20–370)
POC HEMOGLOBIN A1C: 6.9 % (ref 4.2–6.5)

## 2023-08-09 PROCEDURE — 83036 HEMOGLOBIN GLYCOSYLATED A1C: CPT

## 2023-08-09 PROCEDURE — 3052F HG A1C>EQUAL 8.0%<EQUAL 9.0%: CPT

## 2023-08-09 PROCEDURE — 82570 ASSAY OF URINE CREATININE: CPT

## 2023-08-09 PROCEDURE — 1036F TOBACCO NON-USER: CPT

## 2023-08-09 PROCEDURE — 99214 OFFICE O/P EST MOD 30 MIN: CPT

## 2023-08-09 PROCEDURE — 3074F SYST BP LT 130 MM HG: CPT

## 2023-08-09 PROCEDURE — 3079F DIAST BP 80-89 MM HG: CPT

## 2023-08-09 PROCEDURE — 82043 UR ALBUMIN QUANTITATIVE: CPT

## 2023-08-09 PROCEDURE — 3008F BODY MASS INDEX DOCD: CPT

## 2023-08-09 RX ORDER — INSULIN GLARGINE 100 [IU]/ML
48 INJECTION, SOLUTION SUBCUTANEOUS NIGHTLY
Qty: 5 EACH | Refills: 0 | Status: SHIPPED | OUTPATIENT
Start: 2023-08-09 | End: 2023-11-13 | Stop reason: SDUPTHER

## 2023-08-09 RX ORDER — FLASH GLUCOSE SENSOR
KIT MISCELLANEOUS
Qty: 2 EACH | Refills: 1 | Status: SHIPPED | OUTPATIENT
Start: 2023-08-09 | End: 2023-10-06 | Stop reason: SDUPTHER

## 2023-08-09 RX ORDER — ISOPROPYL ALCOHOL 70 ML/100ML
1 SWAB TOPICAL 3 TIMES DAILY
Qty: 100 EACH | Refills: 2 | Status: SHIPPED | OUTPATIENT
Start: 2023-08-09 | End: 2023-11-13 | Stop reason: SDUPTHER

## 2023-08-09 ASSESSMENT — PATIENT HEALTH QUESTIONNAIRE - PHQ9
SUM OF ALL RESPONSES TO PHQ9 QUESTIONS 1 AND 2: 0
1. LITTLE INTEREST OR PLEASURE IN DOING THINGS: NOT AT ALL
2. FEELING DOWN, DEPRESSED OR HOPELESS: NOT AT ALL

## 2023-08-09 NOTE — PROGRESS NOTES
Subjective   Patient ID: Darshan Barroso is a 38 y.o. male who presents for Follow-up.  HPI  Mr. Barroso is a pleasant 38 year old male with PMH of IDDM type 2, HTN, PETE, HLD, vit D def, who presents today for follow up.     DM: Farxiga 10mg, trulicity 4.5mg, lantus 48U nightly. Last A1C 8.0.   HTN: Triamterene/hydrochlorothiazide 37.5-25mg  PETE: not using CPAP since cancer causing study, will send referral to sleep medicine.   HLD: not on statin, LDL 52.   Vit D supplement: 1000U daily supplement     Saw optho and podiatry.   Reports decreased food consumtion on trulicity, does try tow atch diet  65631 steps a day at work, works with [employer]     All systems have been reviewed and are negative for complaint other than those mentioned in the HPI.   Objective   /82 (BP Location: Left arm, Patient Position: Sitting)   Wt 125 kg (276 lb)   BMI 39.60 kg/m²    Physical Exam  Constitutional:       General: He is awake.      Appearance: Normal appearance.   HENT:      Head: Normocephalic and atraumatic.   Eyes:      Extraocular Movements: Extraocular movements intact.      Pupils: Pupils are equal, round, and reactive to light.   Cardiovascular:      Rate and Rhythm: Normal rate and regular rhythm.      Heart sounds: S1 normal and S2 normal. No murmur heard.  Pulmonary:      Effort: Pulmonary effort is normal.      Breath sounds: Normal breath sounds.   Musculoskeletal:      Cervical back: Normal range of motion and neck supple.      Right lower leg: No edema.      Left lower leg: No edema.   Skin:     General: Skin is warm and dry.   Neurological:      General: No focal deficit present.      Mental Status: He is alert and oriented to person, place, and time.   Psychiatric:         Mood and Affect: Mood and affect normal.         Behavior: Behavior normal. Behavior is cooperative.         Thought Content: Thought content normal.         Judgment: Judgment normal.     Darshan was seen today for  follow-up.  Diagnoses and all orders for this visit:  Type 2 diabetes mellitus without complication, with long-term current use of insulin (CMS/[facility]) (Primary)  -     A1C 6.9!!! Keep up the good work.   - Due for urine albumin, ordered today  - Continue current medication  - Albumin, urine, random; Future  -     insulin glargine (Lantus Solostar U-100 Insulin) 100 unit/mL (3 mL) pen; Inject 48 Units under the skin once daily at bedtime. Take as directed per insulin instructions.  -     alcohol swabs (Alcohol Pads) pads, medicated; Apply 1 Pad topically 3 times a day.  -     FreeStyle Susan sensor system (FreeStyle Susan 2 Sensor) kit; Change sensor every 14 days as directed  -     POCT glycosylated hemoglobin (Hb A1C) manually resulted  Dyslipidemia   - LDL near 50 currently, diet controlled   - Will consider statin at age 39  Primary hypertension   - Stable, continue current medication.   Wart of hand  -     Referral to Dermatology; Future    The patient received Provided instructions on dietary changes  Provided instructions on exercise  Advised to Increase physical activity because they have an above normal BMI.    Follow up in 3 months.

## 2023-09-08 ENCOUNTER — TELEMEDICINE (OUTPATIENT)
Dept: PHARMACY | Facility: HOSPITAL | Age: 38
End: 2023-09-08
Payer: COMMERCIAL

## 2023-09-08 DIAGNOSIS — Z79.4 TYPE 2 DIABETES MELLITUS WITHOUT COMPLICATION, WITH LONG-TERM CURRENT USE OF INSULIN (MULTI): ICD-10-CM

## 2023-09-08 DIAGNOSIS — E11.9 TYPE 2 DIABETES MELLITUS WITHOUT COMPLICATION, WITH LONG-TERM CURRENT USE OF INSULIN (MULTI): ICD-10-CM

## 2023-09-08 NOTE — ASSESSMENT & PLAN NOTE
CURRENT PHARMACOTHERAPY  Lantus: 50 units under the skin once a day at night  Farxiga 10m tablet by mouth once a day  Trulicity: 4.5 mg under the skin once a week on      HISTORICAL PHARMACOTHERAPY  Invokana - In , patient was hospitalized with diabetic ketoacidosis (most likely due to medication)  Metformin - Patient reports diarrhea and upset stomach    Patient's BG overall has been trending down towards goal and latest A1c is now at goal of <7%. As patient reports a few low BG, may benefit from further de-escalation of insulin.     RECOMMENDATIONS/PLAN  DECREASE Lantus to 45 units at bedtime.  Continue to stay active and eat a healthy diet consisting of fruits, vegetables, fiber, and protein  Will follow up with patient in ~1 month.

## 2023-09-08 NOTE — PROGRESS NOTES
Pharmacist Clinic: Diabetes Management Follow Up  9/8/23     Subjective   Patient ID: Darshan Barroso is a 38 y.o. male who presents for No chief complaint on file..    Referring Provider: JUSTA Fontanez     Darshan Barroso presents to Clinical Pharmacy after last visit on 8/4/23. At last visit, patient's Lantus was decreased back to 48 units nightly due to patient's BG control.     Since last visit, patient has overall been doing well. He has been busy working double shifts between his two jobs; due to this he occasionally has forgotten to check his BG during the day. Patient still had some readings that were discussed during visit. Patient has continued to get steps in during the day and BG has continued to trend down. Patient's most recent A1c is now at goal at 6.9%.      BG readings to date: 118, 138, 127, 126, 142, 127, 151, 111, 106, 148, 94, 113, 107, 116    Denies any s/sx of hypoglycemia, but does report a few readings that were <70 mg/dL.     _______________________________________________________________________  PHARMACY ASSESSMENT  - Last Opthalmology Visit: August 2023  - Last Podiatry Visit: August 2023  - Last PCP Visit: 8/9/23  - PMH of Pancreatitis: No  - PMH of Retinopathy: No  - PMH of Urinary Tract Infections: No     Diet:   Patient states that he eats 2-3 meals per day, sometimes skipping breakfast. He typically cooks for himself (chicken, fish, etc.) rather than going out to eat. He does not always snack, but will sometimes have chips. When asked about drinks throughout the day, patient states that 75% of the time he drinks mostly water, but sometimes he craves a pop.      Exercise:   Patient states he is not consistent with his exercise. He gets about 10,500 steps each day because he is moving around at his second job a lot. He also tries to go to the gym about once a week. Patient states he has kids who keep him busy and moving around. He has lost about 40lbs since he has been  "working at decreasing his blood sugar.      No issues reported in regards to accessibility, affordability, adherence, adverse effects, or organization.    Objective     There were no vitals taken for this visit.     Labs  Lab Results   Component Value Date    BILITOT 0.4 05/15/2023    CALCIUM 9.5 05/15/2023    CO2 31 05/15/2023     05/15/2023    CREATININE 0.93 05/15/2023    GLUCOSE 144 (H) 05/15/2023    ALKPHOS 94 05/15/2023    K 4.5 05/15/2023    PROT 7.3 05/15/2023     05/15/2023    AST 21 05/15/2023    ALT 35 05/15/2023    BUN 12 05/15/2023    ANIONGAP 12 05/15/2023    MG 1.90 12/27/2021    PHOS 4.1 01/14/2021    ALBUMIN 4.5 05/15/2023    LIPASE 49 12/26/2021    GFRMALE >90 05/15/2023     Lab Results   Component Value Date    TRIG 160 (H) 05/15/2023    CHOL 124 05/15/2023    HDL 40.1 05/15/2023     Lab Results   Component Value Date    HGBA1C 6.9 (A) 08/09/2023       Current Outpatient Medications on File Prior to Visit   Medication Sig Dispense Refill    alcohol swabs (Alcohol Pads) pads, medicated Apply 1 Pad topically 3 times a day. 100 each 2    BD Ultra-Fine Mini Pen Needle 31 gauge x 3/16\" needle Use to inject insulin once a night 90 each 3    cholecalciferol (Vitamin D-3) 25 MCG (1000 UT) tablet Take 1 tablet (25 mcg) by mouth once daily. 90 tablet 0    dapagliflozin propanediol (Farxiga) 10 mg Take 1 tablet (10 mg) by mouth once daily. 90 tablet 0    dulaglutide (Trulicity) 4.5 mg/0.5 mL pen injector Inject 4.5 mg under the skin 1 (one) time per week. Inject yourself once a week every Thursday 2 Pen 3    FreeStyle Susan reader (FreeStyle Susan 2 [place]) misc Inject 1 each under the skin every 14 (fourteen) days. Use as instructed 2 each 3    FreeStyle Susan sensor system (FreeStyle Susan 2 Sensor) kit Change sensor every 14 days as directed 2 each 1    insulin glargine (Lantus Solostar U-100 Insulin) 100 unit/mL (3 mL) pen Inject 48 Units under the skin once daily at bedtime. Take as directed " per insulin instructions. 5 each 0    triamterene-hydrochlorothiazid (Dyazide) 37.5-25 mg capsule Take 1 capsule by mouth once daily. 90 capsule 0     No current facility-administered medications on file prior to visit.        Assessment/Plan   Problem List Items Addressed This Visit       Type 2 diabetes mellitus (CMS/[facility])     CURRENT PHARMACOTHERAPY  Lantus: 50 units under the skin once a day at night  Farxiga 10m tablet by mouth once a day  Trulicity: 4.5 mg under the skin once a week on      HISTORICAL PHARMACOTHERAPY  Invokana - In , patient was hospitalized with diabetic ketoacidosis (most likely due to medication)  Metformin - Patient reports diarrhea and upset stomach    Patient's BG overall has been trending down towards goal and latest A1c is now at goal of <7%. As patient reports a few low BG, may benefit from further de-escalation of insulin.     RECOMMENDATIONS/PLAN  DECREASE Lantus to 45 units at bedtime.  Continue to stay active and eat a healthy diet consisting of fruits, vegetables, fiber, and protein  Will follow up with patient in ~1 month.            Jennie Cline PharmD  Clinical Ambulatory Care Pharmacist  Ph: [phone]    Continue all meds under the continuation of care with the referring provider and clinical pharmacy team.    Clinical Pharmacist follow up: 10/6/23 or sooner as needed based on clinical intervention  Type of Encounter:  Telephone    Verbal consent to manage patient's drug therapy was obtained from the patient. They were informed they may decline to participate or withdraw from participation in pharmacy services at any time.

## 2023-10-06 ENCOUNTER — TELEMEDICINE (OUTPATIENT)
Dept: PHARMACY | Facility: HOSPITAL | Age: 38
End: 2023-10-06
Payer: COMMERCIAL

## 2023-10-06 DIAGNOSIS — Z79.4 TYPE 2 DIABETES MELLITUS WITHOUT COMPLICATION, WITH LONG-TERM CURRENT USE OF INSULIN (MULTI): ICD-10-CM

## 2023-10-06 DIAGNOSIS — E11.9 TYPE 2 DIABETES MELLITUS WITHOUT COMPLICATION, WITH LONG-TERM CURRENT USE OF INSULIN (MULTI): ICD-10-CM

## 2023-10-06 RX ORDER — FLASH GLUCOSE SENSOR
KIT MISCELLANEOUS
Qty: 2 EACH | Refills: 3 | Status: SHIPPED | OUTPATIENT
Start: 2023-10-06 | End: 2023-11-13 | Stop reason: SDUPTHER

## 2023-10-06 NOTE — PROGRESS NOTES
Pharmacist Clinic: Diabetes Management Follow Up  9/8/23     Subjective   Patient ID: Darshan Barroso is a 38 y.o. male who presents for Diabetes.    Referring Provider: JUSTA Fontanez     Darshan Barroso presents to Clinical Pharmacy after last visit on 8/4/23. At last visit, patient's Lantus was decreased back to 48 units nightly due to patient's BG control.     Since last visit, patient has overall been doing well. He has been busy working double shifts between his two jobs; due to this he occasionally has forgotten to check his BG during the day. Patient still had some readings that were discussed during visit. Patient has continued to get steps in during the day and BG has continued to trend down. Patient's most recent A1c is now at goal at 6.9%.      BG readings to date: 118, 138, 127, 126, 142, 127, 151, 111, 106, 148, 94, 113, 107, 116    Denies any s/sx of hypoglycemia, but does report a few readings that were <70 mg/dL.     _______________________________________________________________________  PHARMACY ASSESSMENT  - Last Opthalmology Visit: August 2023  - Last Podiatry Visit: August 2023  - Last PCP Visit: 8/9/23  - PMH of Pancreatitis: No  - PMH of Retinopathy: No  - PMH of Urinary Tract Infections: No     Diet:   Patient states that he eats 2-3 meals per day, sometimes skipping breakfast. He typically cooks for himself (chicken, fish, etc.) rather than going out to eat. He does not always snack, but will sometimes have chips. When asked about drinks throughout the day, patient states that 75% of the time he drinks mostly water, but sometimes he craves a pop.      Exercise:   Patient states he is not consistent with his exercise. He gets about 10,500 steps each day because he is moving around at his second job a lot. He also tries to go to the gym about once a week. Patient states he has kids who keep him busy and moving around. He has lost about 40lbs since he has been working at decreasing  "his blood sugar.      No issues reported in regards to accessibility, affordability, adherence, adverse effects, or organization.    Objective     There were no vitals taken for this visit.     Labs  Lab Results   Component Value Date    BILITOT 0.4 05/15/2023    CALCIUM 9.5 05/15/2023    CO2 31 05/15/2023     05/15/2023    CREATININE 0.93 05/15/2023    GLUCOSE 144 (H) 05/15/2023    ALKPHOS 94 05/15/2023    K 4.5 05/15/2023    PROT 7.3 05/15/2023     05/15/2023    AST 21 05/15/2023    ALT 35 05/15/2023    BUN 12 05/15/2023    ANIONGAP 12 05/15/2023    MG 1.90 12/27/2021    PHOS 4.1 01/14/2021    ALBUMIN 4.5 05/15/2023    LIPASE 49 12/26/2021    GFRMALE >90 05/15/2023     Lab Results   Component Value Date    TRIG 160 (H) 05/15/2023    CHOL 124 05/15/2023    HDL 40.1 05/15/2023     Lab Results   Component Value Date    HGBA1C 6.9 (A) 08/09/2023       Current Outpatient Medications on File Prior to Visit   Medication Sig Dispense Refill    alcohol swabs (Alcohol Pads) pads, medicated Apply 1 Pad topically 3 times a day. 100 each 2    BD Ultra-Fine Mini Pen Needle 31 gauge x 3/16\" needle Use to inject insulin once a night 90 each 3    cholecalciferol (Vitamin D-3) 25 MCG (1000 UT) tablet Take 1 tablet (25 mcg) by mouth once daily. 90 tablet 0    dapagliflozin propanediol (Farxiga) 10 mg Take 1 tablet (10 mg) by mouth once daily. 90 tablet 0    dulaglutide (Trulicity) 4.5 mg/0.5 mL pen injector Inject 4.5 mg under the skin 1 (one) time per week. Inject yourself once a week every Thursday 2 Pen 3    FreeStyle Susan reader (FreeStyle Susan 2 [place]) misc Inject 1 each under the skin every 14 (fourteen) days. Use as instructed 2 each 3    FreeStyle Susan sensor system (FreeStyle Susan 2 Sensor) kit Change sensor every 14 days as directed 2 each 1    insulin glargine (Lantus Solostar U-100 Insulin) 100 unit/mL (3 mL) pen Inject 48 Units under the skin once daily at bedtime. Take as directed per insulin " instructions. 5 each 0    triamterene-hydrochlorothiazid (Dyazide) 37.5-25 mg capsule Take 1 capsule by mouth once daily. 90 capsule 0     No current facility-administered medications on file prior to visit.        Assessment/Plan   Problem List Items Addressed This Visit       Type 2 diabetes mellitus (CMS/[facility])     Patient is currently controlled with an A1C of 6.9%.  Patient states that he is doing well and has not had any episodes of hypoglycemia.  He did state that he has had a decreased appetite lately.      CURRENT PHARMACOTHERAPY  Lantus: 50 units under the skin once a day at night  Farxiga 10m tablet by mouth once a day  Trulicity: 4.5 mg under the skin once a week on      HISTORICAL PHARMACOTHERAPY  Invokana - In , patient was hospitalized with diabetic ketoacidosis (most likely due to medication)  Metformin - Patient reports diarrhea and upset stomach    Patient's BG overall has been trending down towards goal and latest A1c is now at goal of <7%. As patient reports a few low BG, may benefit from further de-escalation of insulin.     RECOMMENDATIONS/PLAN  Continue Lantus to 45 units at bedtime.  Continue to stay active and eat a healthy diet consisting of fruits, vegetables, fiber, and protein  Will follow up with patient in ~2 month.            Afshan Saba, PharmD    Continue all meds under the continuation of care with the referring provider and clinical pharmacy team.    Clinical Pharmacist follow up: 2 months or sooner as needed based on clinical intervention  Type of Encounter:  Telephone    Verbal consent to manage patient's drug therapy was obtained from the patient. They were informed they may decline to participate or withdraw from participation in pharmacy services at any time.

## 2023-10-06 NOTE — ASSESSMENT & PLAN NOTE
Patient is currently controlled with an A1C of 6.9%.  Patient states that he is doing well and has not had any episodes of hypoglycemia.  He did state that he has had a decreased appetite lately.      CURRENT PHARMACOTHERAPY  Lantus: 50 units under the skin once a day at night  Farxiga 10m tablet by mouth once a day  Trulicity: 4.5 mg under the skin once a week on      HISTORICAL PHARMACOTHERAPY  Invokana - In , patient was hospitalized with diabetic ketoacidosis (most likely due to medication)  Metformin - Patient reports diarrhea and upset stomach    Patient's BG overall has been trending down towards goal and latest A1c is now at goal of <7%. As patient reports a few low BG, may benefit from further de-escalation of insulin.     RECOMMENDATIONS/PLAN  Continue Lantus to 45 units at bedtime.  Continue to stay active and eat a healthy diet consisting of fruits, vegetables, fiber, and protein  Will follow up with patient in ~2 month.

## 2023-11-10 ENCOUNTER — APPOINTMENT (OUTPATIENT)
Dept: PRIMARY CARE | Facility: CLINIC | Age: 38
End: 2023-11-10
Payer: COMMERCIAL

## 2023-11-13 ENCOUNTER — TELEMEDICINE (OUTPATIENT)
Dept: PRIMARY CARE | Facility: CLINIC | Age: 38
End: 2023-11-13
Payer: COMMERCIAL

## 2023-11-13 DIAGNOSIS — Z79.4 TYPE 2 DIABETES MELLITUS WITHOUT COMPLICATION, WITH LONG-TERM CURRENT USE OF INSULIN (MULTI): Primary | ICD-10-CM

## 2023-11-13 DIAGNOSIS — Z11.59 NEED FOR HEPATITIS C SCREENING TEST: ICD-10-CM

## 2023-11-13 DIAGNOSIS — E11.9 TYPE 2 DIABETES MELLITUS WITHOUT COMPLICATION, WITH LONG-TERM CURRENT USE OF INSULIN (MULTI): Primary | ICD-10-CM

## 2023-11-13 DIAGNOSIS — I10 PRIMARY HYPERTENSION: ICD-10-CM

## 2023-11-13 DIAGNOSIS — Z11.4 ENCOUNTER FOR SCREENING FOR HIV: ICD-10-CM

## 2023-11-13 PROCEDURE — 99214 OFFICE O/P EST MOD 30 MIN: CPT

## 2023-11-13 RX ORDER — FLASH GLUCOSE SENSOR
KIT MISCELLANEOUS
Qty: 2 EACH | Refills: 3 | Status: SHIPPED | OUTPATIENT
Start: 2023-11-13 | End: 2024-05-29 | Stop reason: WASHOUT

## 2023-11-13 RX ORDER — ISOPROPYL ALCOHOL 70 ML/100ML
1 SWAB TOPICAL 3 TIMES DAILY
Qty: 100 EACH | Refills: 2 | Status: SHIPPED | OUTPATIENT
Start: 2023-11-13 | End: 2024-05-29 | Stop reason: SDUPTHER

## 2023-11-13 RX ORDER — DULAGLUTIDE 4.5 MG/.5ML
4.5 INJECTION, SOLUTION SUBCUTANEOUS
Qty: 6 ML | Refills: 1 | Status: SHIPPED | OUTPATIENT
Start: 2023-11-13 | End: 2024-05-29 | Stop reason: WASHOUT

## 2023-11-13 RX ORDER — TRIAMTERENE AND HYDROCHLOROTHIAZIDE 37.5; 25 MG/1; MG/1
1 CAPSULE ORAL DAILY
Qty: 90 CAPSULE | Refills: 1 | Status: SHIPPED | OUTPATIENT
Start: 2023-11-13 | End: 2024-05-29 | Stop reason: SDUPTHER

## 2023-11-13 RX ORDER — PEN NEEDLE, DIABETIC 31 GX5/16"
NEEDLE, DISPOSABLE MISCELLANEOUS
Qty: 90 EACH | Refills: 3 | Status: SHIPPED | OUTPATIENT
Start: 2023-11-13 | End: 2024-05-29 | Stop reason: SDUPTHER

## 2023-11-13 RX ORDER — DAPAGLIFLOZIN 10 MG/1
10 TABLET, FILM COATED ORAL DAILY
Qty: 90 TABLET | Refills: 1 | Status: SHIPPED | OUTPATIENT
Start: 2023-11-13 | End: 2024-05-29 | Stop reason: WASHOUT

## 2023-11-13 RX ORDER — INSULIN GLARGINE 100 [IU]/ML
48 INJECTION, SOLUTION SUBCUTANEOUS NIGHTLY
Qty: 5 EACH | Refills: 1 | Status: SHIPPED | OUTPATIENT
Start: 2023-11-13 | End: 2023-11-14 | Stop reason: CLARIF

## 2023-11-13 NOTE — PROGRESS NOTES
"Subjective   Patient ID: Darshan Barroso is a 38 y.o. male who presents via virtual visit for Follow-up.  An interactive audio and video telecommunication system which permits real time communications between the patient (at the originating site) and provider (at the distant site) was utilized to provide this telehealth service.    Verbal consent was requested and obtained from the patient on the day of encounter.  HPI  Mr. Barroso is a pleasant 38 year old male with PMH of IDDM type 2, HTN, PETE, HLD, vit D def, who presents today for follow up.      DM: Farxiga 10mg, trulicity 4.5mg, lantus 45U nightly. Last A1C 6.9.  HTN: Triamterene/hydrochlorothiazide 37.5-25mg  HLD: not on statin, LDL 52.   Vit D supplement: 1000U daily supplement     All systems have been reviewed and are negative for complaint other than those mentioned in the HPI.     Objective   Physical Exam  Constitutional:       Appearance: Normal appearance.   Pulmonary:      Effort: Pulmonary effort is normal.   Neurological:      General: No focal deficit present.      Mental Status: He is alert and oriented to person, place, and time.   Psychiatric:         Mood and Affect: Mood normal.         Behavior: Behavior normal.     Darshan was seen today for follow-up.  Diagnoses and all orders for this visit:  Type 2 diabetes mellitus without complication, with long-term current use of insulin (CMS/[facility]) (Primary)  -     Last A1C stable, at goal of <7  - Continue current medication  - Recheck A1C, order placed. Patient to come to lab.   - alcohol swabs (Alcohol Pads) pads, medicated; Apply 1 Pad topically 3 times a day.  -     BD Ultra-Fine Mini Pen Needle 31 gauge x 3/16\" needle; Use to inject insulin once a night  -     FreeStyle Susan sensor system (FreeStyle Susan 2 Sensor) kit; Change sensor every 14 days as directed  -     dapagliflozin propanediol (Farxiga) 10 mg; Take 1 tablet (10 mg) by mouth once daily.  -     dulaglutide (Trulicity) 4.5 mg/0.5 " mL pen injector; Inject 4.5 mg under the skin 1 (one) time per week. Inject yourself once a week every Thursday  -     insulin glargine (Lantus Solostar U-100 Insulin) 100 unit/mL (3 mL) pen; Inject 48 Units under the skin once daily at bedtime. Take as directed per insulin instructions.  -     Comprehensive Metabolic Panel; Future  -     Hemoglobin A1C; Future  -     Lipid Panel; Future  Primary hypertension  -   Not checking BP at home, next visit in person for BP check. Has been stable, continue current emdication. Can consider adding ACE-I due to diabetes diagnosis   -   triamterene-hydrochlorothiazid (Dyazide) 37.5-25 mg capsule; Take 1 capsule by mouth once daily.  Need for hepatitis C screening test  -     Hepatitis C antibody; Future  Encounter for screening for HIV  -     HIV 1/2 Antigen/Antibody Screen with Reflex to Confirmation; Future    Due for physical, patient scheduled for 2 weeks.

## 2023-11-14 DIAGNOSIS — E11.9 TYPE 2 DIABETES MELLITUS WITHOUT COMPLICATION, WITH LONG-TERM CURRENT USE OF INSULIN (MULTI): ICD-10-CM

## 2023-11-14 DIAGNOSIS — Z79.4 TYPE 2 DIABETES MELLITUS WITHOUT COMPLICATION, WITH LONG-TERM CURRENT USE OF INSULIN (MULTI): ICD-10-CM

## 2023-11-14 RX ORDER — INSULIN GLARGINE 100 [IU]/ML
48 INJECTION, SOLUTION SUBCUTANEOUS NIGHTLY
COMMUNITY
End: 2023-11-14 | Stop reason: CLARIF

## 2023-11-14 RX ORDER — INSULIN GLARGINE 100 [IU]/ML
48 INJECTION, SOLUTION SUBCUTANEOUS NIGHTLY
Qty: 15 ML | Refills: 0 | Status: SHIPPED | OUTPATIENT
Start: 2023-11-14 | End: 2024-05-29 | Stop reason: WASHOUT

## 2023-11-27 ENCOUNTER — APPOINTMENT (OUTPATIENT)
Dept: PRIMARY CARE | Facility: CLINIC | Age: 38
End: 2023-11-27
Payer: COMMERCIAL

## 2023-11-30 ENCOUNTER — OFFICE VISIT (OUTPATIENT)
Dept: PRIMARY CARE | Facility: CLINIC | Age: 38
End: 2023-11-30
Payer: COMMERCIAL

## 2023-11-30 ENCOUNTER — LAB (OUTPATIENT)
Dept: LAB | Facility: LAB | Age: 38
End: 2023-11-30
Payer: COMMERCIAL

## 2023-11-30 VITALS
DIASTOLIC BLOOD PRESSURE: 80 MMHG | WEIGHT: 266 LBS | BODY MASS INDEX: 39.4 KG/M2 | SYSTOLIC BLOOD PRESSURE: 120 MMHG | HEIGHT: 69 IN

## 2023-11-30 DIAGNOSIS — E11.9 TYPE 2 DIABETES MELLITUS WITHOUT COMPLICATION, WITH LONG-TERM CURRENT USE OF INSULIN (MULTI): ICD-10-CM

## 2023-11-30 DIAGNOSIS — E78.5 DYSLIPIDEMIA: ICD-10-CM

## 2023-11-30 DIAGNOSIS — Z11.59 NEED FOR HEPATITIS C SCREENING TEST: ICD-10-CM

## 2023-11-30 DIAGNOSIS — Z79.4 TYPE 2 DIABETES MELLITUS WITHOUT COMPLICATION, WITH LONG-TERM CURRENT USE OF INSULIN (MULTI): ICD-10-CM

## 2023-11-30 DIAGNOSIS — Z11.4 ENCOUNTER FOR SCREENING FOR HIV: ICD-10-CM

## 2023-11-30 DIAGNOSIS — I10 PRIMARY HYPERTENSION: ICD-10-CM

## 2023-11-30 DIAGNOSIS — Z00.00 HEALTH MAINTENANCE EXAMINATION: Primary | ICD-10-CM

## 2023-11-30 DIAGNOSIS — E55.9 VITAMIN D DEFICIENCY: ICD-10-CM

## 2023-11-30 PROBLEM — N47.6 BALANOPOSTHITIS: Status: RESOLVED | Noted: 2023-03-21 | Resolved: 2023-11-30

## 2023-11-30 PROBLEM — M72.2 PLANTAR FASCIITIS, RIGHT: Status: RESOLVED | Noted: 2023-03-21 | Resolved: 2023-11-30

## 2023-11-30 PROBLEM — M21.40 PES PLANUS: Status: RESOLVED | Noted: 2023-03-21 | Resolved: 2023-11-30

## 2023-11-30 LAB
ALBUMIN SERPL BCP-MCNC: 4.5 G/DL (ref 3.4–5)
ALP SERPL-CCNC: 84 U/L (ref 33–120)
ALT SERPL W P-5'-P-CCNC: 32 U/L (ref 10–52)
ANION GAP SERPL CALC-SCNC: 16 MMOL/L (ref 10–20)
AST SERPL W P-5'-P-CCNC: 24 U/L (ref 9–39)
BILIRUB SERPL-MCNC: 0.4 MG/DL (ref 0–1.2)
BUN SERPL-MCNC: 11 MG/DL (ref 6–23)
CALCIUM SERPL-MCNC: 9.2 MG/DL (ref 8.6–10.6)
CHLORIDE SERPL-SCNC: 98 MMOL/L (ref 98–107)
CHOLEST SERPL-MCNC: 106 MG/DL (ref 0–199)
CHOLESTEROL/HDL RATIO: 2.8
CO2 SERPL-SCNC: 26 MMOL/L (ref 21–32)
CREAT SERPL-MCNC: 1.15 MG/DL (ref 0.5–1.3)
EST. AVERAGE GLUCOSE BLD GHB EST-MCNC: 169 MG/DL
GFR SERPL CREATININE-BSD FRML MDRD: 83 ML/MIN/1.73M*2
GLUCOSE SERPL-MCNC: 150 MG/DL (ref 74–99)
HBA1C MFR BLD: 7.5 %
HCV AB SER QL: NONREACTIVE
HDLC SERPL-MCNC: 37.6 MG/DL
HIV 1+2 AB+HIV1 P24 AG SERPL QL IA: NONREACTIVE
LDLC SERPL CALC-MCNC: 15 MG/DL
NON HDL CHOLESTEROL: 68 MG/DL (ref 0–149)
POTASSIUM SERPL-SCNC: 3.7 MMOL/L (ref 3.5–5.3)
PROT SERPL-MCNC: 7.6 G/DL (ref 6.4–8.2)
SODIUM SERPL-SCNC: 136 MMOL/L (ref 136–145)
TRIGL SERPL-MCNC: 267 MG/DL (ref 0–149)
VLDL: 53 MG/DL (ref 0–40)

## 2023-11-30 PROCEDURE — 3008F BODY MASS INDEX DOCD: CPT

## 2023-11-30 PROCEDURE — 87389 HIV-1 AG W/HIV-1&-2 AB AG IA: CPT

## 2023-11-30 PROCEDURE — 1036F TOBACCO NON-USER: CPT

## 2023-11-30 PROCEDURE — 80061 LIPID PANEL: CPT

## 2023-11-30 PROCEDURE — 36415 COLL VENOUS BLD VENIPUNCTURE: CPT

## 2023-11-30 PROCEDURE — 3074F SYST BP LT 130 MM HG: CPT

## 2023-11-30 PROCEDURE — 80053 COMPREHEN METABOLIC PANEL: CPT

## 2023-11-30 PROCEDURE — 99395 PREV VISIT EST AGE 18-39: CPT

## 2023-11-30 PROCEDURE — 83036 HEMOGLOBIN GLYCOSYLATED A1C: CPT

## 2023-11-30 PROCEDURE — 86803 HEPATITIS C AB TEST: CPT

## 2023-11-30 PROCEDURE — 3052F HG A1C>EQUAL 8.0%<EQUAL 9.0%: CPT

## 2023-11-30 PROCEDURE — 3079F DIAST BP 80-89 MM HG: CPT

## 2023-11-30 RX ORDER — CHOLECALCIFEROL (VITAMIN D3) 25 MCG
1000 TABLET ORAL DAILY
Qty: 90 TABLET | Refills: 1 | Status: SHIPPED | OUTPATIENT
Start: 2023-11-30 | End: 2024-05-29 | Stop reason: SDUPTHER

## 2023-11-30 ASSESSMENT — LIFESTYLE VARIABLES: HOW OFTEN DO YOU HAVE A DRINK CONTAINING ALCOHOL: MONTHLY OR LESS

## 2023-11-30 ASSESSMENT — PATIENT HEALTH QUESTIONNAIRE - PHQ9
2. FEELING DOWN, DEPRESSED OR HOPELESS: NOT AT ALL
1. LITTLE INTEREST OR PLEASURE IN DOING THINGS: NOT AT ALL
SUM OF ALL RESPONSES TO PHQ9 QUESTIONS 1 AND 2: 0

## 2023-11-30 NOTE — PROGRESS NOTES
"Subjective   Patient ID: Darshan Barroso is a 38 y.o. male who presents for CPE.  HPI  Mr. Barroso is a pleasant 38 year old male with PMH of IDDM type 2, HTN, PETE, HLD, vit D def, who presents today for CPE.      DM: Farxiga 10mg, trulicity 4.5mg, lantus 45U nightly. Last A1C 6.9.   HTN: Triamterene/hydrochlorothiazide 37.5-25mg  PETE: Was using CPAP machine, lost weight, sleep has now improved, no longer using CPAP. Declined sleep study.   HLD: not on statin, LDL 52.   Vit D supplement: 1000U daily supplement     Using Freestyle Susan 2 CGM. Often forgets reader at home.   Weight trending down, lost 10lb in past 3 months.     Diet: Watching carbs, follows diabetic diet.   Exercise: Wants to start working out. Planning to get membership to [employer].   Nicotine: Vaping intermittently, considering quitting  ETOH: Social, intermittent   Drug use: None  Dental care: UTD, no issues   Vision concerns: UTD, gets checked once yearly   Hearing concerns: None    Fax updated paperwork [phone]  All systems have been reviewed and are negative for complaint other than those mentioned in the HPI.     /80 (BP Location: Left arm, Patient Position: Sitting, BP Cuff Size: Large adult)   Ht 1.753 m (5' 9\")   Wt 121 kg (266 lb)   BMI 39.28 kg/m²    Objective   Physical Exam  Constitutional:       General: He is awake.      Appearance: Normal appearance.   HENT:      Head: Normocephalic and atraumatic.   Eyes:      Extraocular Movements: Extraocular movements intact.      Conjunctiva/sclera: Conjunctivae normal.      Pupils: Pupils are equal, round, and reactive to light.   Neck:      Thyroid: No thyroid mass or thyromegaly.      Vascular: No carotid bruit.   Cardiovascular:      Rate and Rhythm: Normal rate and regular rhythm.      Pulses: Normal pulses.      Heart sounds: S1 normal and S2 normal. No murmur heard.  Pulmonary:      Effort: Pulmonary effort is normal.      Breath sounds: Normal breath sounds. "   Abdominal:      General: Abdomen is flat. Bowel sounds are normal.      Palpations: Abdomen is soft. There is no hepatomegaly, splenomegaly, mass or pulsatile mass.      Tenderness: There is no abdominal tenderness.   Musculoskeletal:      Cervical back: Normal range of motion and neck supple.      Right lower leg: No edema.      Left lower leg: No edema.   Skin:     General: Skin is warm and dry.      Capillary Refill: Capillary refill takes less than 2 seconds.   Neurological:      General: No focal deficit present.      Mental Status: He is alert and oriented to person, place, and time.   Psychiatric:         Mood and Affect: Mood normal.         Behavior: Behavior normal. Behavior is cooperative.         Thought Content: Thought content normal.         Judgment: Judgment normal.       Darshan was seen today for cpe.  Diagnoses and all orders for this visit:  Health maintenance examination (Primary)   - UTD on HM labs   - Immunizations UTD other than due for TDAP   - Diet and exercise discussed, trying to limit carbohydrates, planning to get a membership to [employer] and start exercising. Motivated.   Dyslipidemia   - Diet controlled, LDL 52, not on statin, recheck today.   Primary hypertension   - At goal, continue current medication.   Type 2 diabetes mellitus without complication, with long-term current use of insulin (CMS/[facility])  -     Last A1C at goal, 6.9  - Continue working on diet and exercise.   - Recommend downloading freestyle rory debra on phone so he doesn't have to carry two devices and is less likely to forget reader.   - Referral to Podiatry; Future  Vitamin D deficiency  -     cholecalciferol (Vitamin D-3) 25 MCG (1000 UT) tablet; Take 1 tablet (1,000 Units) by mouth once daily.    Follow up in 3 months.

## 2023-12-01 ENCOUNTER — TELEMEDICINE (OUTPATIENT)
Dept: PHARMACY | Facility: HOSPITAL | Age: 38
End: 2023-12-01
Payer: COMMERCIAL

## 2023-12-01 DIAGNOSIS — E11.9 TYPE 2 DIABETES MELLITUS WITHOUT COMPLICATION, WITH LONG-TERM CURRENT USE OF INSULIN (MULTI): Primary | ICD-10-CM

## 2023-12-01 DIAGNOSIS — Z79.4 TYPE 2 DIABETES MELLITUS WITHOUT COMPLICATION, WITH LONG-TERM CURRENT USE OF INSULIN (MULTI): Primary | ICD-10-CM

## 2023-12-01 NOTE — ASSESSMENT & PLAN NOTE
Patient is currently uncontrolled with an A1C of 7.5% (was previously controlled at 6.9%).  Patient states that he is doing well and has not had any episodes of hypoglycemia.  He did state that he has had a decreased appetite lately. Patient states that he was not very consistant with taking his medications over the last month or so and has not been eating the best with the holidays.  Discussed ways to remember medications and reminded patient to keep checking his blood sugar levels. Discussed using his phone as the reader to monitor his blood sugar and patient stated that he will start when he puts on his new sensor.        CURRENT PHARMACOTHERAPY  Lantus: 47 units under the skin once a day at night. Dose was increased at last PCP visit yesterday due to elevated A1C.    Farxiga 10m tablet by mouth once a day  Trulicity: 4.5 mg under the skin once a week on      HISTORICAL PHARMACOTHERAPY  Invokana - In , patient was hospitalized with diabetic ketoacidosis (most likely due to medication)  Metformin - Patient reports diarrhea and upset stomach    RECOMMENDATIONS/PLAN  Continue Lantus to 47 units at bedtime.  Continue to stay active and eat a healthy diet consisting of fruits, vegetables, fiber, and protein  Will follow up with patient in ~6 weeks to see if therapy change is needed. Patient would like to try diet and lifestyle changes before changing medication therapy.

## 2023-12-01 NOTE — PROGRESS NOTES
Pharmacist Clinic: Diabetes Management Follow Up  9/8/23     Subjective   Patient ID: Darshan Barroso is a 38 y.o. male who presents for Diabetes.    Referring Provider: JUSTA Fontanez     Darshan Barroso presents to Clinical Pharmacy after last visit on 8/4/23. At last visit, patient's Lantus was increased to 47 units nightly due to patient's BG control.     Since last visit, patient has overall been doing well. He has been busy working double shifts between his two jobs; due to this he occasionally has forgotten to check his BG during the day. Patient still had some readings that were discussed during visit. Patient has continued to get steps in during the day and BG has continued to trend down. Patient's most recent A1c is now at goal at 6.9%.      BG readings to date: 118, 138, 127, 126, 142, 127, 151, 111, 106, 148, 94, 113, 107, 116,134    Denies any s/sx of hypoglycemia, but does report a few readings that were <70 mg/dL.     Diabetes    _______________________________________________________________________  PHARMACY ASSESSMENT  - Last Opthalmology Visit: August 2023  - Last Podiatry Visit: August 2023  - Last PCP Visit: 11/30/23  - PMH of Pancreatitis: No  - PMH of Retinopathy: No  - PMH of Urinary Tract Infections: No     Diet:   Patient states that he eats 2-3 meals per day, sometimes skipping breakfast. He typically cooks for himself (chicken, fish, etc.) rather than going out to eat. He does not always snack, but will sometimes have chips. When asked about drinks throughout the day, patient states that 75% of the time he drinks mostly water, but sometimes he craves a pop.      Exercise:   Patient states he is not consistent with his exercise. He gets about 10,500 steps each day because he is moving around at his second job a lot. He also tries to go to the gym about once a week. Patient states he has kids who keep him busy and moving around. He has lost about 40lbs since he has been working  "at decreasing his blood sugar.      No issues reported in regards to accessibility, affordability, adherence, adverse effects, or organization.    Objective     There were no vitals taken for this visit.     Labs  Lab Results   Component Value Date    BILITOT 0.4 11/30/2023    CALCIUM 9.2 11/30/2023    CO2 26 11/30/2023    CL 98 11/30/2023    CREATININE 1.15 11/30/2023    GLUCOSE 150 (H) 11/30/2023    ALKPHOS 84 11/30/2023    K 3.7 11/30/2023    PROT 7.6 11/30/2023     11/30/2023    AST 24 11/30/2023    ALT 32 11/30/2023    BUN 11 11/30/2023    ANIONGAP 16 11/30/2023    MG 1.90 12/27/2021    PHOS 4.1 01/14/2021    ALBUMIN 4.5 11/30/2023    LIPASE 49 12/26/2021    GFRMALE >90 05/15/2023     Lab Results   Component Value Date    TRIG 267 (H) 11/30/2023    CHOL 106 11/30/2023    LDLCALC 15 11/30/2023    HDL 37.6 11/30/2023     Lab Results   Component Value Date    HGBA1C 7.5 (H) 11/30/2023       Current Outpatient Medications on File Prior to Visit   Medication Sig Dispense Refill    alcohol swabs (Alcohol Pads) pads, medicated Apply 1 Pad topically 3 times a day. 100 each 2    Basaglar KwikPen U-100 Insulin 100 unit/mL (3 mL) pen Inject 48 Units under the skin once daily at bedtime. Take as directed per insulin instructions. 15 mL 0    BD Ultra-Fine Mini Pen Needle 31 gauge x 3/16\" needle Use to inject insulin once a night 90 each 3    cholecalciferol (Vitamin D-3) 25 MCG (1000 UT) tablet Take 1 tablet (1,000 Units) by mouth once daily. 90 tablet 1    dapagliflozin propanediol (Farxiga) 10 mg Take 1 tablet (10 mg) by mouth once daily. 90 tablet 1    dulaglutide (Trulicity) 4.5 mg/0.5 mL pen injector Inject 4.5 mg under the skin 1 (one) time per week. Inject yourself once a week every Thursday 6 mL 1    FreeStyle Susan reader (FreeStyle Susan 2 [place]) misc Inject 1 each under the skin every 14 (fourteen) days. Use as instructed 2 each 3    FreeStyle Susan sensor system (FreeStyle Susan 2 Sensor) kit Change " sensor every 14 days as directed 2 each 3    triamterene-hydrochlorothiazid (Dyazide) 37.5-25 mg capsule Take 1 capsule by mouth once daily. 90 capsule 1    [DISCONTINUED] cholecalciferol (Vitamin D-3) 25 MCG (1000 UT) tablet Take 1 tablet (25 mcg) by mouth once daily. 90 tablet 0     No current facility-administered medications on file prior to visit.        Assessment/Plan   Problem List Items Addressed This Visit       Type 2 diabetes mellitus (CMS/[facility]) - Primary     Patient is currently uncontrolled with an A1C of 7.5% (was previously controlled at 6.9%).  Patient states that he is doing well and has not had any episodes of hypoglycemia.  He did state that he has had a decreased appetite lately. Patient states that he was not very consistant with taking his medications over the last month or so and has not been eating the best with the holidays.  Discussed ways to remember medications and reminded patient to keep checking his blood sugar levels. Discussed using his phone as the reader to monitor his blood sugar and patient stated that he will start when he puts on his new sensor.        CURRENT PHARMACOTHERAPY  Lantus: 47 units under the skin once a day at night. Dose was increased at last PCP visit yesterday due to elevated A1C.    Farxiga 10m tablet by mouth once a day  Trulicity: 4.5 mg under the skin once a week on      HISTORICAL PHARMACOTHERAPY  Invokana - In , patient was hospitalized with diabetic ketoacidosis (most likely due to medication)  Metformin - Patient reports diarrhea and upset stomach    RECOMMENDATIONS/PLAN  Continue Lantus to 47 units at bedtime.  Continue to stay active and eat a healthy diet consisting of fruits, vegetables, fiber, and protein  Will follow up with patient in ~6 weeks to see if therapy change is needed. Patient would like to try diet and lifestyle changes before changing medication therapy.            Afshna Saba, PharmD    Continue all meds under  the continuation of care with the referring provider and clinical pharmacy team.    Clinical Pharmacist follow up: 2 months or sooner as needed based on clinical intervention  Type of Encounter:  Telephone    Verbal consent to manage patient's drug therapy was obtained from the patient. They were informed they may decline to participate or withdraw from participation in pharmacy services at any time.

## 2024-01-12 ENCOUNTER — TELEMEDICINE (OUTPATIENT)
Dept: PHARMACY | Facility: HOSPITAL | Age: 39
End: 2024-01-12
Payer: COMMERCIAL

## 2024-01-12 DIAGNOSIS — Z79.4 TYPE 2 DIABETES MELLITUS WITHOUT COMPLICATION, WITH LONG-TERM CURRENT USE OF INSULIN (MULTI): Primary | ICD-10-CM

## 2024-01-12 DIAGNOSIS — E11.9 TYPE 2 DIABETES MELLITUS WITHOUT COMPLICATION, WITH LONG-TERM CURRENT USE OF INSULIN (MULTI): Primary | ICD-10-CM

## 2024-01-12 PROCEDURE — RXMED WILLOW AMBULATORY MEDICATION CHARGE

## 2024-01-12 RX ORDER — FLASH GLUCOSE SENSOR
KIT MISCELLANEOUS
Qty: 2 EACH | Refills: 6 | Status: SHIPPED | OUTPATIENT
Start: 2024-01-12 | End: 2024-05-29 | Stop reason: WASHOUT

## 2024-01-12 RX ORDER — INSULIN GLARGINE 100 [IU]/ML
47 INJECTION, SOLUTION SUBCUTANEOUS NIGHTLY
Qty: 15 ML | Refills: 6 | Status: SHIPPED | OUTPATIENT
Start: 2024-01-12 | End: 2024-05-29 | Stop reason: SDUPTHER

## 2024-01-12 RX ORDER — DAPAGLIFLOZIN 10 MG/1
10 TABLET, FILM COATED ORAL DAILY
Qty: 30 TABLET | Refills: 6 | Status: SHIPPED | OUTPATIENT
Start: 2024-01-12 | End: 2024-05-29 | Stop reason: SDUPTHER

## 2024-01-12 RX ORDER — DULAGLUTIDE 4.5 MG/.5ML
4.5 INJECTION, SOLUTION SUBCUTANEOUS
Qty: 2 ML | Refills: 6 | Status: SHIPPED | OUTPATIENT
Start: 2024-01-12 | End: 2024-05-29 | Stop reason: SDUPTHER

## 2024-01-12 ASSESSMENT — ENCOUNTER SYMPTOMS: DIABETIC ASSOCIATED SYMPTOMS: 0

## 2024-01-12 NOTE — PROGRESS NOTES
I reviewed the progress note and agree with the resident’s findings and plans as written. Case discussed with resident.    Afshan Saba, TannaD

## 2024-01-12 NOTE — ASSESSMENT & PLAN NOTE
Assessment  Patient's diabetes slightly uncontrolled with a most recent A1c of 7.5% in 11/2023. Patient is taking Lantus, Trulicity, and Farxiga for diabetic management. Patient's most recent blood sugars within goal range for the most part and patient wants to continue to improve diet and exercise.     Plan  Send refills of all medications to [facility] for home delivery.   Follow up in 6 weeks for blood sugar readings and medication tolerability and efficacy

## 2024-01-12 NOTE — PROGRESS NOTES
Subjective   Patient ID: Darshan Barroso is a 38 y.o. male who presents for Diabetes.    Referring Provider: JUSTA Fontanez     Diabetes  He presents for his follow-up diabetic visit. He has type 2 diabetes mellitus. His disease course has been stable. There are no hypoglycemic associated symptoms. There are no diabetic associated symptoms. There are no hypoglycemic complications.     Patient requested refills for all medications and did state that he has been out of sensors for about 2 weeks so does not have recent blood glucose readings. However, we reviewed readings from middle of December and those readings mostly within goal with a high of 183. Denies any hypoglycemia symptoms or hyperglycemia symptoms. Asked about a detox and advised to make sure he has the sensors if he is planning on doing anything with extended fasting and to potentially avoid insulin use if attempting. Denies any medication side effects. Does endorse some weight loss, but talks about wanting to exercise more for more weight loss.     Objective     There were no vitals taken for this visit.     Labs  Lab Results   Component Value Date    BILITOT 0.4 11/30/2023    CALCIUM 9.2 11/30/2023    CO2 26 11/30/2023    CL 98 11/30/2023    CREATININE 1.15 11/30/2023    GLUCOSE 150 (H) 11/30/2023    ALKPHOS 84 11/30/2023    K 3.7 11/30/2023    PROT 7.6 11/30/2023     11/30/2023    AST 24 11/30/2023    ALT 32 11/30/2023    BUN 11 11/30/2023    ANIONGAP 16 11/30/2023    MG 1.90 12/27/2021    PHOS 4.1 01/14/2021    ALBUMIN 4.5 11/30/2023    LIPASE 49 12/26/2021    GFRMALE >90 05/15/2023     Lab Results   Component Value Date    TRIG 267 (H) 11/30/2023    CHOL 106 11/30/2023    LDLCALC 15 11/30/2023    HDL 37.6 11/30/2023     Lab Results   Component Value Date    HGBA1C 7.5 (H) 11/30/2023       Current Outpatient Medications on File Prior to Visit   Medication Sig Dispense Refill    alcohol swabs (Alcohol Pads) pads, medicated Apply 1 Pad  "topically 3 times a day. 100 each 2    Basaglar KwikPen U-100 Insulin 100 unit/mL (3 mL) pen Inject 48 Units under the skin once daily at bedtime. Take as directed per insulin instructions. 15 mL 0    BD Ultra-Fine Mini Pen Needle 31 gauge x 3/16\" needle Use to inject insulin once a night 90 each 3    cholecalciferol (Vitamin D-3) 25 MCG (1000 UT) tablet Take 1 tablet (1,000 Units) by mouth once daily. 90 tablet 1    dapagliflozin propanediol (Farxiga) 10 mg Take 1 tablet (10 mg) by mouth once daily. 90 tablet 1    dulaglutide (Trulicity) 4.5 mg/0.5 mL pen injector Inject 4.5 mg under the skin 1 (one) time per week. Inject yourself once a week every Thursday 6 mL 1    FreeStyle Susan reader (FreeStyle Susan 2 [place]) misc Inject 1 each under the skin every 14 (fourteen) days. Use as instructed 2 each 3    FreeStyle Susan sensor system (FreeStyle Susan 2 Sensor) kit Change sensor every 14 days as directed 2 each 3    triamterene-hydrochlorothiazid (Dyazide) 37.5-25 mg capsule Take 1 capsule by mouth once daily. 90 capsule 1     No current facility-administered medications on file prior to visit.        Assessment/Plan   Problem List Items Addressed This Visit       Type 2 diabetes mellitus (CMS/[facility]) - Primary     Assessment  Patient's diabetes slightly uncontrolled with a most recent A1c of 7.5% in 11/2023. Patient is taking Lantus, Trulicity, and Farxiga for diabetic management. Patient's most recent blood sugars within goal range for the most part and patient wants to continue to improve diet and exercise.     Plan  Send refills of all medications to [facility] for home delivery.   Follow up in 6 weeks for blood sugar readings and medication tolerability and efficacy            Hans Bhatti, PharmD  PGY1 Pharmacy Resident    Continue all meds under the continuation of care with the referring provider and clinical pharmacy team.        "

## 2024-01-16 ENCOUNTER — PHARMACY VISIT (OUTPATIENT)
Dept: PHARMACY | Facility: CLINIC | Age: 39
End: 2024-01-16
Payer: MEDICARE

## 2024-02-08 PROCEDURE — RXMED WILLOW AMBULATORY MEDICATION CHARGE

## 2024-02-09 ENCOUNTER — PHARMACY VISIT (OUTPATIENT)
Dept: PHARMACY | Facility: CLINIC | Age: 39
End: 2024-02-09
Payer: MEDICARE

## 2024-02-09 PROCEDURE — RXMED WILLOW AMBULATORY MEDICATION CHARGE

## 2024-02-23 ENCOUNTER — TELEMEDICINE (OUTPATIENT)
Dept: PHARMACY | Facility: HOSPITAL | Age: 39
End: 2024-02-23
Payer: COMMERCIAL

## 2024-02-23 DIAGNOSIS — Z79.4 TYPE 2 DIABETES MELLITUS WITHOUT COMPLICATION, WITH LONG-TERM CURRENT USE OF INSULIN (MULTI): Primary | ICD-10-CM

## 2024-02-23 DIAGNOSIS — E11.9 TYPE 2 DIABETES MELLITUS WITHOUT COMPLICATION, WITH LONG-TERM CURRENT USE OF INSULIN (MULTI): Primary | ICD-10-CM

## 2024-02-23 ASSESSMENT — ENCOUNTER SYMPTOMS: DIABETIC ASSOCIATED SYMPTOMS: 0

## 2024-02-23 NOTE — ASSESSMENT & PLAN NOTE
Continue taking all medications as prescribed for diabetes including Lantus, Trulicity, and Farxiga.   Continue improving diet and exercise for ideal glycemic control  Follow up in 6 weeks to assess glycemic control and if a change in therapy is needed.

## 2024-02-23 NOTE — PROGRESS NOTES
Subjective   Patient ID: Darshan Barroso is a 39 y.o. male who presents for Diabetes.    Referring Provider: JUSTA Fontanez     Diabetes  He presents for his follow-up diabetic visit. He has type 2 diabetes mellitus. His disease course has been stable. There are no hypoglycemic associated symptoms. There are no diabetic associated symptoms. There are no hypoglycemic complications.     Met with patient regarding diabetes. His CGM sugars fluctuate depending on time of day but seem to be in the 120-160 range mostly, within both fasting and post-prandial goals. Denies any hypoglycemia symptoms or hyperglycemia symptoms. Patient is tolerating all medications. Denies any recent changes to diet or exercise and stated at last visit that he is working to improve A1c by improving diet and exercise regimens. He asks about what is too low of a sugar reading to use Lantus so discussed with patient not to use insulin if sugars below <70 or if symptoms of low blood sugar (shaking, sweating, feeling faint) are present. Discussed with patient that Lantus is a long-acting insulin so it won't cause as dramatic a decrease of blood sugars as a rapid-acting insulin like Humalog. Patient expressed understanding and had no further questions at this time. Patient's diabetes is slightly uncontrolled per most recent A1c at 7.5%, but sugars at this time seem to be mostly in range.     Objective     There were no vitals taken for this visit.     Labs  Lab Results   Component Value Date    BILITOT 0.4 11/30/2023    CALCIUM 9.2 11/30/2023    CO2 26 11/30/2023    CL 98 11/30/2023    CREATININE 1.15 11/30/2023    GLUCOSE 150 (H) 11/30/2023    ALKPHOS 84 11/30/2023    K 3.7 11/30/2023    PROT 7.6 11/30/2023     11/30/2023    AST 24 11/30/2023    ALT 32 11/30/2023    BUN 11 11/30/2023    ANIONGAP 16 11/30/2023    MG 1.90 12/27/2021    PHOS 4.1 01/14/2021    ALBUMIN 4.5 11/30/2023    LIPASE 49 12/26/2021    GFRMALE >90 05/15/2023  "    Lab Results   Component Value Date    TRIG 267 (H) 11/30/2023    CHOL 106 11/30/2023    LDLCALC 15 11/30/2023    HDL 37.6 11/30/2023     Lab Results   Component Value Date    HGBA1C 7.5 (H) 11/30/2023       Current Outpatient Medications on File Prior to Visit   Medication Sig Dispense Refill    alcohol swabs (Alcohol Pads) pads, medicated Apply 1 Pad topically 3 times a day. 100 each 2    Basaglar KwikPen U-100 Insulin 100 unit/mL (3 mL) pen Inject 48 Units under the skin once daily at bedtime. Take as directed per insulin instructions. 15 mL 0    BD Ultra-Fine Mini Pen Needle 31 gauge x 3/16\" needle Use to inject insulin once a night 90 each 3    cholecalciferol (Vitamin D-3) 25 MCG (1000 UT) tablet Take 1 tablet (1,000 Units) by mouth once daily. 90 tablet 1    dapagliflozin propanediol (Farxiga) 10 mg Take 1 tablet (10 mg) by mouth once daily. 90 tablet 1    dapagliflozin propanediol (Farxiga) 10 mg Take 1 tablet (10 mg) by mouth once daily. 30 tablet 6    dulaglutide (Trulicity) 4.5 mg/0.5 mL pen injector Inject 4.5 mg under the skin 1 (one) time per week. Inject yourself once a week every Thursday 6 mL 1    dulaglutide (Trulicity) 4.5 mg/0.5 mL pen injector Inject 4.5 mg under the skin 1 (one) time per week. 2 mL 6    FreeStyle Susan reader (FreeStyle Susan 2 [place]) misc Inject 1 each under the skin every 14 (fourteen) days. Use as instructed 2 each 3    FreeStyle [place] sensor system (FreeStyle Susan 2 Sensor) kit Change sensor every 14 days as directed 2 each 3    flash glucose sensor kit (FreeStyle Susan 2 Sensor) kit Change every 14 days as directed 2 each 6    insulin glargine (Lantus) 100 unit/mL (3 mL) pen Inject 47 Units under the skin once daily at bedtime. Take as directed per insulin instructions. 15 mL 6    triamterene-hydrochlorothiazid (Dyazide) 37.5-25 mg capsule Take 1 capsule by mouth once daily. 90 capsule 1     No current facility-administered medications on file prior to visit.    "     Assessment/Plan   Problem List Items Addressed This Visit       Type 2 diabetes mellitus (CMS/[facility]) - Primary     Continue taking all medications as prescribed for diabetes including Lantus, Trulicity, and Farxiga.   Continue improving diet and exercise for ideal glycemic control  Follow up in 6 weeks to assess glycemic control and if a change in therapy is needed.          Hans Bhatti, PharmD  PGY1 Pharmacy Resident    Continue all meds under the continuation of care with the referring provider and clinical pharmacy team.

## 2024-03-03 PROCEDURE — RXMED WILLOW AMBULATORY MEDICATION CHARGE

## 2024-03-08 ENCOUNTER — PHARMACY VISIT (OUTPATIENT)
Dept: PHARMACY | Facility: CLINIC | Age: 39
End: 2024-03-08
Payer: MEDICARE

## 2024-03-11 PROCEDURE — RXMED WILLOW AMBULATORY MEDICATION CHARGE

## 2024-03-15 PROCEDURE — RXMED WILLOW AMBULATORY MEDICATION CHARGE

## 2024-03-19 ENCOUNTER — PHARMACY VISIT (OUTPATIENT)
Dept: PHARMACY | Facility: CLINIC | Age: 39
End: 2024-03-19
Payer: MEDICARE

## 2024-03-20 ENCOUNTER — PHARMACY VISIT (OUTPATIENT)
Dept: PHARMACY | Facility: CLINIC | Age: 39
End: 2024-03-20
Payer: MEDICARE

## 2024-03-29 DIAGNOSIS — E11.9 TYPE 2 DIABETES MELLITUS WITHOUT COMPLICATION, WITH LONG-TERM CURRENT USE OF INSULIN (MULTI): ICD-10-CM

## 2024-03-29 DIAGNOSIS — Z79.4 TYPE 2 DIABETES MELLITUS WITHOUT COMPLICATION, WITH LONG-TERM CURRENT USE OF INSULIN (MULTI): ICD-10-CM

## 2024-03-29 PROCEDURE — RXMED WILLOW AMBULATORY MEDICATION CHARGE

## 2024-03-29 RX ORDER — BLOOD-GLUCOSE,RECEIVER,CONT
1 EACH MISCELLANEOUS AS NEEDED
COMMUNITY
End: 2024-03-29

## 2024-03-29 RX ORDER — BLOOD-GLUCOSE SENSOR
1 EACH MISCELLANEOUS SEE ADMIN INSTRUCTIONS
Qty: 4 EACH | Refills: 3 | Status: SHIPPED | OUTPATIENT
Start: 2024-03-29 | End: 2024-05-02

## 2024-03-29 RX ORDER — BLOOD-GLUCOSE,RECEIVER,CONT
1 EACH MISCELLANEOUS AS NEEDED
COMMUNITY
End: 2024-03-29 | Stop reason: SDUPTHER

## 2024-03-29 RX ORDER — BLOOD-GLUCOSE SENSOR
1 EACH MISCELLANEOUS
COMMUNITY
End: 2024-03-29 | Stop reason: SDUPTHER

## 2024-03-29 RX ORDER — BLOOD-GLUCOSE,RECEIVER,CONT
EACH MISCELLANEOUS
Qty: 1 EACH | Refills: 0 | Status: SHIPPED | OUTPATIENT
Start: 2024-03-29 | End: 2024-05-29 | Stop reason: WASHOUT

## 2024-04-04 ENCOUNTER — PHARMACY VISIT (OUTPATIENT)
Dept: PHARMACY | Facility: CLINIC | Age: 39
End: 2024-04-04
Payer: MEDICARE

## 2024-04-05 ENCOUNTER — TELEMEDICINE (OUTPATIENT)
Dept: PHARMACY | Facility: HOSPITAL | Age: 39
End: 2024-04-05
Payer: COMMERCIAL

## 2024-04-05 DIAGNOSIS — E11.9 TYPE 2 DIABETES MELLITUS WITHOUT COMPLICATION, WITH LONG-TERM CURRENT USE OF INSULIN (MULTI): Primary | ICD-10-CM

## 2024-04-05 DIAGNOSIS — Z79.4 TYPE 2 DIABETES MELLITUS WITHOUT COMPLICATION, WITH LONG-TERM CURRENT USE OF INSULIN (MULTI): Primary | ICD-10-CM

## 2024-04-05 ASSESSMENT — ENCOUNTER SYMPTOMS: DIABETIC ASSOCIATED SYMPTOMS: 0

## 2024-04-06 NOTE — ASSESSMENT & PLAN NOTE
Continue taking all medications as prescribed for diabetes including Lantus, Trulicity, and Farxiga.   Continue improving diet and exercise for ideal glycemic control  New A1C ordered for patient to complete before next pharmacy visit.    Follow up in 2 weeks to assess glycemic control and if a change in therapy is needed.

## 2024-04-06 NOTE — PROGRESS NOTES
Subjective   Patient ID: Darshan Barroso is a 39 y.o. male who presents for Diabetes.    Referring Provider: JUSTA Fontanez     Diabetes  He presents for his follow-up diabetic visit. He has type 2 diabetes mellitus. His disease course has been stable. There are no hypoglycemic associated symptoms. There are no diabetic associated symptoms. There are no hypoglycemic complications.     Met with patient regarding diabetes. His CGM sugars fluctuate depending on time of day but seem to be in the 120-160 range mostly, within both fasting and post-prandial goals. Denies any hypoglycemia symptoms or hyperglycemia symptoms. Patient is tolerating all medications. Denies any recent changes to diet or exercise and stated at last visit that he is working to improve A1c by improving diet and exercise regimens. He asks about what is too low of a sugar reading to use Lantus so discussed with patient not to use insulin if sugars below <70 or if symptoms of low blood sugar (shaking, sweating, feeling faint) are present. Discussed with patient that Lantus is a long-acting insulin so it won't cause as dramatic a decrease of blood sugars as a rapid-acting insulin like Humalog. Patient expressed understanding and had no further questions at this time. Patient's diabetes is slightly uncontrolled per most recent A1c at 7.5%, but sugars at this time seem to be mostly in range.     Objective     There were no vitals taken for this visit.     Labs  Lab Results   Component Value Date    BILITOT 0.4 11/30/2023    CALCIUM 9.2 11/30/2023    CO2 26 11/30/2023    CL 98 11/30/2023    CREATININE 1.15 11/30/2023    GLUCOSE 150 (H) 11/30/2023    ALKPHOS 84 11/30/2023    K 3.7 11/30/2023    PROT 7.6 11/30/2023     11/30/2023    AST 24 11/30/2023    ALT 32 11/30/2023    BUN 11 11/30/2023    ANIONGAP 16 11/30/2023    MG 1.90 12/27/2021    PHOS 4.1 01/14/2021    ALBUMIN 4.5 11/30/2023    LIPASE 49 12/26/2021    GFRMALE >90 05/15/2023  "    Lab Results   Component Value Date    TRIG 267 (H) 11/30/2023    CHOL 106 11/30/2023    LDLCALC 15 11/30/2023    HDL 37.6 11/30/2023     Lab Results   Component Value Date    HGBA1C 7.5 (H) 11/30/2023       Current Outpatient Medications on File Prior to Visit   Medication Sig Dispense Refill    alcohol swabs (Alcohol Pads) pads, medicated Apply 1 Pad topically 3 times a day. 100 each 2    Basaglar KwikPen U-100 Insulin 100 unit/mL (3 mL) pen Inject 48 Units under the skin once daily at bedtime. Take as directed per insulin instructions. 15 mL 0    BD Ultra-Fine Mini Pen Needle 31 gauge x 3/16\" needle Use to inject insulin once a night 90 each 3    blood-glucose meter,continuous (Dexcom G7 ) misc Disp for continuous use with sensor in event phone goes down or lost 1 each 0    blood-glucose sensor (Dexcom G7 Sensor) device 1 each see administration instructions. Replace every 10 days. 4 each 3    cholecalciferol (Vitamin D-3) 25 MCG (1000 UT) tablet Take 1 tablet (1,000 Units) by mouth once daily. 90 tablet 1    dapagliflozin propanediol (Farxiga) 10 mg Take 1 tablet (10 mg) by mouth once daily. 90 tablet 1    dapagliflozin propanediol (Farxiga) 10 mg Take 1 tablet (10 mg) by mouth once daily. 30 tablet 6    dulaglutide (Trulicity) 4.5 mg/0.5 mL pen injector Inject 4.5 mg under the skin 1 (one) time per week. Inject yourself once a week every Thursday 6 mL 1    dulaglutide (Trulicity) 4.5 mg/0.5 mL pen injector Inject 4.5 mg under the skin 1 (one) time per week. 2 mL 6    FreeStyle Susan reader (FreeStyle Susan 2 [place]) misc Inject 1 each under the skin every 14 (fourteen) days. Use as instructed 2 each 3    FreeStyle Susan sensor system (FreeStyle Susan 2 Sensor) kit Change sensor every 14 days as directed 2 each 3    flash glucose sensor kit (FreeStyle Susan 2 Sensor) kit Change every 14 days as directed 2 each 6    insulin glargine (Lantus) 100 unit/mL (3 mL) pen Inject 47 Units under the skin once " daily at bedtime. Take as directed per insulin instructions. 15 mL 6    triamterene-hydrochlorothiazid (Dyazide) 37.5-25 mg capsule Take 1 capsule by mouth once daily. 90 capsule 1     No current facility-administered medications on file prior to visit.        Assessment/Plan   Problem List Items Addressed This Visit       Type 2 diabetes mellitus (CMS/[facility]) - Primary     Continue taking all medications as prescribed for diabetes including Lantus, Trulicity, and Farxiga.   Continue improving diet and exercise for ideal glycemic control  New A1C ordered for patient to complete before next pharmacy visit.    Follow up in 2 weeks to assess glycemic control and if a change in therapy is needed.            Afshan Saba, PharmD      Continue all meds under the continuation of care with the referring provider and clinical pharmacy team.

## 2024-04-19 ENCOUNTER — TELEMEDICINE (OUTPATIENT)
Dept: PHARMACY | Facility: HOSPITAL | Age: 39
End: 2024-04-19
Payer: COMMERCIAL

## 2024-04-19 DIAGNOSIS — E11.9 TYPE 2 DIABETES MELLITUS WITHOUT COMPLICATION, WITH LONG-TERM CURRENT USE OF INSULIN (MULTI): Primary | ICD-10-CM

## 2024-04-19 DIAGNOSIS — Z79.4 TYPE 2 DIABETES MELLITUS WITHOUT COMPLICATION, WITH LONG-TERM CURRENT USE OF INSULIN (MULTI): Primary | ICD-10-CM

## 2024-04-19 ASSESSMENT — ENCOUNTER SYMPTOMS: DIABETIC ASSOCIATED SYMPTOMS: 0

## 2024-04-19 NOTE — PROGRESS NOTES
Subjective   Patient ID: Darshna Barroso is a 39 y.o. male who presents for Diabetes.    Referring Provider: JUSTA Fontanez     Diabetes  He presents for his follow-up diabetic visit. He has type 2 diabetes mellitus. His disease course has been stable. There are no hypoglycemic associated symptoms. There are no diabetic associated symptoms. There are no hypoglycemic complications.     Met with patient regarding diabetes. His CGM sugars fluctuate depending on time of day but seem to be at goal or slightly above goal. He tries to get a reading at least twice a day and he would like to be better at getting readings more frequently. Denies any hypoglycemia symptoms or hyperglycemia symptoms. Patient is tolerating all medications. Denies any recent changes to diet or exercise but is planning to get a membership at his local [employer]. He eats in moderation and is on his feet for most of the day. Patient's diabetes is slightly uncontrolled per most recent A1c at 7.5%, but sugars at this time seem to be mostly in range. Patient to get A1c done this weekend, order was placed at last visit.     Using a dexcom G7 to monitor blood sugars. On Farxiga 10mg daily, Trulicty 4.5 mg QW, and Lantus 47 units HS      4/19 172 179   4/18 157    4/17 206    4/16 139    4/15 143        Objective     There were no vitals taken for this visit.     Labs  Lab Results   Component Value Date    BILITOT 0.4 11/30/2023    CALCIUM 9.2 11/30/2023    CO2 26 11/30/2023    CL 98 11/30/2023    CREATININE 1.15 11/30/2023    GLUCOSE 150 (H) 11/30/2023    ALKPHOS 84 11/30/2023    K 3.7 11/30/2023    PROT 7.6 11/30/2023     11/30/2023    AST 24 11/30/2023    ALT 32 11/30/2023    BUN 11 11/30/2023    ANIONGAP 16 11/30/2023    MG 1.90 12/27/2021    PHOS 4.1 01/14/2021    ALBUMIN 4.5 11/30/2023    LIPASE 49 12/26/2021    GFRMALE >90 05/15/2023     Lab Results   Component Value Date    TRIG 267 (H) 11/30/2023    CHOL 106 11/30/2023    LDLCALC 15  "11/30/2023    HDL 37.6 11/30/2023     Lab Results   Component Value Date    HGBA1C 7.5 (H) 11/30/2023       Current Outpatient Medications on File Prior to Visit   Medication Sig Dispense Refill    alcohol swabs (Alcohol Pads) pads, medicated Apply 1 Pad topically 3 times a day. 100 each 2    Basaglar KwikPen U-100 Insulin 100 unit/mL (3 mL) pen Inject 48 Units under the skin once daily at bedtime. Take as directed per insulin instructions. 15 mL 0    BD Ultra-Fine Mini Pen Needle 31 gauge x 3/16\" needle Use to inject insulin once a night 90 each 3    blood-glucose meter,continuous (Dexcom G7 ) misc Disp for continuous use with sensor in event phone goes down or lost 1 each 0    blood-glucose sensor (Dexcom G7 Sensor) device 1 each see administration instructions. Replace every 10 days. 4 each 3    cholecalciferol (Vitamin D-3) 25 MCG (1000 UT) tablet Take 1 tablet (1,000 Units) by mouth once daily. 90 tablet 1    dapagliflozin propanediol (Farxiga) 10 mg Take 1 tablet (10 mg) by mouth once daily. 90 tablet 1    dapagliflozin propanediol (Farxiga) 10 mg Take 1 tablet (10 mg) by mouth once daily. 30 tablet 6    dulaglutide (Trulicity) 4.5 mg/0.5 mL pen injector Inject 4.5 mg under the skin 1 (one) time per week. Inject yourself once a week every Thursday 6 mL 1    dulaglutide (Trulicity) 4.5 mg/0.5 mL pen injector Inject 4.5 mg under the skin 1 (one) time per week. 2 mL 6    FreeStyle Susan reader (FreeStyle Susan 2 [place]) misc Inject 1 each under the skin every 14 (fourteen) days. Use as instructed 2 each 3    FreeStyle Susan sensor system (FreeStyle Susan 2 Sensor) kit Change sensor every 14 days as directed 2 each 3    flash glucose sensor kit (FreeStyle Susan 2 Sensor) kit Change every 14 days as directed 2 each 6    insulin glargine (Lantus) 100 unit/mL (3 mL) pen Inject 47 Units under the skin once daily at bedtime. Take as directed per insulin instructions. 15 mL 6    triamterene-hydrochlorothiazid " (Dyazide) 37.5-25 mg capsule Take 1 capsule by mouth once daily. 90 capsule 1     No current facility-administered medications on file prior to visit.        Assessment/Plan   Problem List Items Addressed This Visit       Type 2 diabetes mellitus (Multi) - Primary     Continue current diabetic regimen of Lantus, Farxiga, and Trulicity.   Get A1c drawn over weekend with order in place   Follow up in 1 week to discuss A1c and sugar results and change therapy as indicated.              Hans Bhatti, PharmD  PGY1 Pharmacy Resident    Continue all meds under the continuation of care with the referring provider and clinical pharmacy team.

## 2024-04-19 NOTE — ASSESSMENT & PLAN NOTE
Continue current diabetic regimen of Lantus, Farxiga, and Trulicity.   Get A1c drawn over weekend with order in place   Follow up in 1 week to discuss A1c and sugar results and change therapy as indicated.

## 2024-04-22 ENCOUNTER — LAB (OUTPATIENT)
Dept: LAB | Facility: LAB | Age: 39
End: 2024-04-22
Payer: COMMERCIAL

## 2024-04-22 DIAGNOSIS — Z79.4 TYPE 2 DIABETES MELLITUS WITHOUT COMPLICATION, WITH LONG-TERM CURRENT USE OF INSULIN (MULTI): ICD-10-CM

## 2024-04-22 DIAGNOSIS — E11.9 TYPE 2 DIABETES MELLITUS WITHOUT COMPLICATION, WITH LONG-TERM CURRENT USE OF INSULIN (MULTI): ICD-10-CM

## 2024-04-22 LAB
EST. AVERAGE GLUCOSE BLD GHB EST-MCNC: 157 MG/DL
HBA1C MFR BLD: 7.1 %

## 2024-04-22 PROCEDURE — 36415 COLL VENOUS BLD VENIPUNCTURE: CPT

## 2024-04-22 PROCEDURE — 83036 HEMOGLOBIN GLYCOSYLATED A1C: CPT

## 2024-04-26 ENCOUNTER — TELEMEDICINE (OUTPATIENT)
Dept: PHARMACY | Facility: HOSPITAL | Age: 39
End: 2024-04-26
Payer: COMMERCIAL

## 2024-04-26 DIAGNOSIS — E11.9 TYPE 2 DIABETES MELLITUS WITHOUT COMPLICATION, WITH LONG-TERM CURRENT USE OF INSULIN (MULTI): Primary | ICD-10-CM

## 2024-04-26 DIAGNOSIS — Z79.4 TYPE 2 DIABETES MELLITUS WITHOUT COMPLICATION, WITH LONG-TERM CURRENT USE OF INSULIN (MULTI): Primary | ICD-10-CM

## 2024-04-26 ASSESSMENT — ENCOUNTER SYMPTOMS: DIABETIC ASSOCIATED SYMPTOMS: 0

## 2024-04-26 NOTE — PROGRESS NOTES
Subjective   Patient ID: Darshan Barroso is a 39 y.o. male who presents for Diabetes.    Referring Provider: JUSTA Fontanez     Diabetes  He presents for his follow-up diabetic visit. He has type 2 diabetes mellitus. His disease course has been stable. There are no hypoglycemic associated symptoms. There are no diabetic associated symptoms. There are no hypoglycemic complications.     Met with patient regarding diabetes. Patient is feeling more energized. He denies any side effects with medications. He would eventually like to decrease Lantus. His most recent A1c is 7.1%, down from 7.5%. Patient's reported sugars are mostly at goal.     Using a dexcom G7 to monitor blood sugars. On Farxiga 10mg daily, Trulicty 4.5 mg QW, and Lantus 47 units HS    Morning Afternoon   129 124   126 181   120    167 123         Exercise: Has gotten membership to [employer] and has been going 2-3 times weekly. Running on treadmill, lifting, and swimming classes.    Diet: Eats in moderation. No reported changes to diet      Objective     There were no vitals taken for this visit.     Labs  Lab Results   Component Value Date    BILITOT 0.4 11/30/2023    CALCIUM 9.2 11/30/2023    CO2 26 11/30/2023    CL 98 11/30/2023    CREATININE 1.15 11/30/2023    GLUCOSE 150 (H) 11/30/2023    ALKPHOS 84 11/30/2023    K 3.7 11/30/2023    PROT 7.6 11/30/2023     11/30/2023    AST 24 11/30/2023    ALT 32 11/30/2023    BUN 11 11/30/2023    ANIONGAP 16 11/30/2023    MG 1.90 12/27/2021    PHOS 4.1 01/14/2021    ALBUMIN 4.5 11/30/2023    LIPASE 49 12/26/2021    GFRMALE >90 05/15/2023     Lab Results   Component Value Date    TRIG 267 (H) 11/30/2023    CHOL 106 11/30/2023    LDLCALC 15 11/30/2023    HDL 37.6 11/30/2023     Lab Results   Component Value Date    HGBA1C 7.1 (H) 04/22/2024       Current Outpatient Medications on File Prior to Visit   Medication Sig Dispense Refill    alcohol swabs (Alcohol Pads) pads, medicated Apply 1 Pad topically 3  "times a day. 100 each 2    Basaglar KwikPen U-100 Insulin 100 unit/mL (3 mL) pen Inject 48 Units under the skin once daily at bedtime. Take as directed per insulin instructions. 15 mL 0    BD Ultra-Fine Mini Pen Needle 31 gauge x 3/16\" needle Use to inject insulin once a night 90 each 3    blood-glucose meter,continuous (Dexcom G7 ) misc Disp for continuous use with sensor in event phone goes down or lost 1 each 0    blood-glucose sensor (Dexcom G7 Sensor) device 1 each see administration instructions. Replace every 10 days. 4 each 3    cholecalciferol (Vitamin D-3) 25 MCG (1000 UT) tablet Take 1 tablet (1,000 Units) by mouth once daily. 90 tablet 1    dapagliflozin propanediol (Farxiga) 10 mg Take 1 tablet (10 mg) by mouth once daily. 90 tablet 1    dapagliflozin propanediol (Farxiga) 10 mg Take 1 tablet (10 mg) by mouth once daily. 30 tablet 6    dulaglutide (Trulicity) 4.5 mg/0.5 mL pen injector Inject 4.5 mg under the skin 1 (one) time per week. Inject yourself once a week every Thursday 6 mL 1    dulaglutide (Trulicity) 4.5 mg/0.5 mL pen injector Inject 4.5 mg under the skin 1 (one) time per week. 2 mL 6    FreeStyle Susan reader (FreeStyle Susan 2 [place]) misc Inject 1 each under the skin every 14 (fourteen) days. Use as instructed 2 each 3    FreeStyle Susan sensor system (FreeStyle Susan 2 Sensor) kit Change sensor every 14 days as directed 2 each 3    flash glucose sensor kit (FreeStyle Susan 2 Sensor) kit Change every 14 days as directed 2 each 6    insulin glargine (Lantus) 100 unit/mL (3 mL) pen Inject 47 Units under the skin once daily at bedtime. Take as directed per insulin instructions. 15 mL 6    triamterene-hydrochlorothiazid (Dyazide) 37.5-25 mg capsule Take 1 capsule by mouth once daily. 90 capsule 1     No current facility-administered medications on file prior to visit.        Assessment/Plan   Problem List Items Addressed This Visit       Type 2 diabetes mellitus (Multi) - Primary     " Continue medications as prescribed. A1c at goal.   If sugars continue to trend down, discussed potentially slowly lowering Lantus in the future.   Continue lifestyle modifications  Follow up in 3 months to reassess blood sugars/A1c and make a decision regarding Lantus            Hans Bhatti, PharmD  PGY1 Pharmacy Resident    Continue all meds under the continuation of care with the referring provider and clinical pharmacy team.

## 2024-04-29 DIAGNOSIS — Z79.4 TYPE 2 DIABETES MELLITUS WITHOUT COMPLICATION, WITH LONG-TERM CURRENT USE OF INSULIN (MULTI): ICD-10-CM

## 2024-04-29 DIAGNOSIS — E11.9 TYPE 2 DIABETES MELLITUS WITHOUT COMPLICATION, WITH LONG-TERM CURRENT USE OF INSULIN (MULTI): ICD-10-CM

## 2024-04-30 RX ORDER — BLOOD-GLUCOSE,RECEIVER,CONT
EACH MISCELLANEOUS
Qty: 1 EACH | Refills: 0 | Status: SHIPPED | OUTPATIENT
Start: 2024-04-30 | End: 2024-05-03 | Stop reason: SDUPTHER

## 2024-05-02 DIAGNOSIS — E11.9 TYPE 2 DIABETES MELLITUS WITHOUT COMPLICATION, WITH LONG-TERM CURRENT USE OF INSULIN (MULTI): Primary | ICD-10-CM

## 2024-05-02 DIAGNOSIS — Z79.4 TYPE 2 DIABETES MELLITUS WITHOUT COMPLICATION, WITH LONG-TERM CURRENT USE OF INSULIN (MULTI): Primary | ICD-10-CM

## 2024-05-02 RX ORDER — BLOOD-GLUCOSE SENSOR
EACH MISCELLANEOUS
Qty: 2 EACH | Refills: 3 | Status: SHIPPED | OUTPATIENT
Start: 2024-05-02 | End: 2024-05-03 | Stop reason: SDUPTHER

## 2024-05-03 DIAGNOSIS — E11.9 TYPE 2 DIABETES MELLITUS WITHOUT COMPLICATION, WITH LONG-TERM CURRENT USE OF INSULIN (MULTI): ICD-10-CM

## 2024-05-03 DIAGNOSIS — Z79.4 TYPE 2 DIABETES MELLITUS WITHOUT COMPLICATION, WITH LONG-TERM CURRENT USE OF INSULIN (MULTI): ICD-10-CM

## 2024-05-03 RX ORDER — BLOOD-GLUCOSE,RECEIVER,CONT
EACH MISCELLANEOUS
Qty: 1 EACH | Refills: 0 | Status: SHIPPED | OUTPATIENT
Start: 2024-05-03 | End: 2024-05-03 | Stop reason: SDUPTHER

## 2024-05-03 RX ORDER — BLOOD-GLUCOSE,RECEIVER,CONT
EACH MISCELLANEOUS
Qty: 1 EACH | Refills: 0 | Status: SHIPPED | OUTPATIENT
Start: 2024-05-03 | End: 2024-05-29 | Stop reason: WASHOUT

## 2024-05-03 RX ORDER — BLOOD-GLUCOSE SENSOR
EACH MISCELLANEOUS
Qty: 2 EACH | Refills: 3 | Status: SHIPPED | OUTPATIENT
Start: 2024-05-03 | End: 2024-05-03 | Stop reason: SDUPTHER

## 2024-05-03 RX ORDER — BLOOD-GLUCOSE SENSOR
EACH MISCELLANEOUS
Qty: 2 EACH | Refills: 3 | Status: SHIPPED | OUTPATIENT
Start: 2024-05-03

## 2024-05-07 PROCEDURE — RXMED WILLOW AMBULATORY MEDICATION CHARGE

## 2024-05-16 ENCOUNTER — PHARMACY VISIT (OUTPATIENT)
Dept: PHARMACY | Facility: CLINIC | Age: 39
End: 2024-05-16
Payer: MEDICARE

## 2024-05-29 ENCOUNTER — OFFICE VISIT (OUTPATIENT)
Dept: PRIMARY CARE | Facility: CLINIC | Age: 39
End: 2024-05-29
Payer: COMMERCIAL

## 2024-05-29 VITALS
BODY MASS INDEX: 39.65 KG/M2 | DIASTOLIC BLOOD PRESSURE: 83 MMHG | WEIGHT: 277 LBS | HEIGHT: 70 IN | SYSTOLIC BLOOD PRESSURE: 126 MMHG

## 2024-05-29 DIAGNOSIS — E11.9 TYPE 2 DIABETES MELLITUS WITHOUT COMPLICATION, WITH LONG-TERM CURRENT USE OF INSULIN (MULTI): Primary | ICD-10-CM

## 2024-05-29 DIAGNOSIS — Z23 NEED FOR TDAP VACCINATION: ICD-10-CM

## 2024-05-29 DIAGNOSIS — Z79.4 TYPE 2 DIABETES MELLITUS WITHOUT COMPLICATION, WITH LONG-TERM CURRENT USE OF INSULIN (MULTI): Primary | ICD-10-CM

## 2024-05-29 DIAGNOSIS — E55.9 VITAMIN D DEFICIENCY: ICD-10-CM

## 2024-05-29 DIAGNOSIS — I10 PRIMARY HYPERTENSION: ICD-10-CM

## 2024-05-29 PROBLEM — E78.5 DYSLIPIDEMIA: Status: RESOLVED | Noted: 2023-03-21 | Resolved: 2024-05-29

## 2024-05-29 PROCEDURE — 90471 IMMUNIZATION ADMIN: CPT

## 2024-05-29 PROCEDURE — 3051F HG A1C>EQUAL 7.0%<8.0%: CPT

## 2024-05-29 PROCEDURE — 1036F TOBACCO NON-USER: CPT

## 2024-05-29 PROCEDURE — 3079F DIAST BP 80-89 MM HG: CPT

## 2024-05-29 PROCEDURE — 3074F SYST BP LT 130 MM HG: CPT

## 2024-05-29 PROCEDURE — 99214 OFFICE O/P EST MOD 30 MIN: CPT

## 2024-05-29 PROCEDURE — 3008F BODY MASS INDEX DOCD: CPT

## 2024-05-29 PROCEDURE — RXMED WILLOW AMBULATORY MEDICATION CHARGE

## 2024-05-29 PROCEDURE — 90715 TDAP VACCINE 7 YRS/> IM: CPT

## 2024-05-29 RX ORDER — PEN NEEDLE, DIABETIC 31 GX5/16"
NEEDLE, DISPOSABLE MISCELLANEOUS
Qty: 90 EACH | Refills: 3 | Status: SHIPPED | OUTPATIENT
Start: 2024-05-29

## 2024-05-29 RX ORDER — INSULIN GLARGINE 100 [IU]/ML
47 INJECTION, SOLUTION SUBCUTANEOUS NIGHTLY
Qty: 5 EACH | Refills: 0 | Status: SHIPPED | OUTPATIENT
Start: 2024-05-29 | End: 2025-05-29

## 2024-05-29 RX ORDER — CHOLECALCIFEROL (VITAMIN D3) 25 MCG
1000 TABLET ORAL DAILY
Qty: 90 TABLET | Refills: 0 | Status: SHIPPED | OUTPATIENT
Start: 2024-05-29

## 2024-05-29 RX ORDER — TRIAMTERENE AND HYDROCHLOROTHIAZIDE 37.5; 25 MG/1; MG/1
1 CAPSULE ORAL DAILY
Qty: 90 CAPSULE | Refills: 0 | Status: SHIPPED | OUTPATIENT
Start: 2024-05-29

## 2024-05-29 RX ORDER — DAPAGLIFLOZIN 10 MG/1
10 TABLET, FILM COATED ORAL DAILY
Qty: 90 TABLET | Refills: 0 | Status: SHIPPED | OUTPATIENT
Start: 2024-05-29 | End: 2024-06-03 | Stop reason: SDUPTHER

## 2024-05-29 RX ORDER — ISOPROPYL ALCOHOL 70 ML/100ML
1 SWAB TOPICAL 3 TIMES DAILY
Qty: 100 EACH | Refills: 2 | Status: SHIPPED | OUTPATIENT
Start: 2024-05-29

## 2024-05-29 RX ORDER — TIRZEPATIDE 2.5 MG/.5ML
2.5 INJECTION, SOLUTION SUBCUTANEOUS
Qty: 2 ML | Refills: 0 | Status: SHIPPED | OUTPATIENT
Start: 2024-06-02

## 2024-05-29 RX ORDER — DULAGLUTIDE 4.5 MG/.5ML
4.5 INJECTION, SOLUTION SUBCUTANEOUS
Qty: 2 ML | Refills: 0 | Status: SHIPPED | OUTPATIENT
Start: 2024-06-02

## 2024-05-29 ASSESSMENT — PATIENT HEALTH QUESTIONNAIRE - PHQ9
1. LITTLE INTEREST OR PLEASURE IN DOING THINGS: NOT AT ALL
2. FEELING DOWN, DEPRESSED OR HOPELESS: NOT AT ALL
SUM OF ALL RESPONSES TO PHQ9 QUESTIONS 1 AND 2: 0

## 2024-05-29 NOTE — PROGRESS NOTES
"Subjective   Patient ID: Darshan Barroso is a 39 y.o. male who presents for Follow-up.  HPI  Mr. Barroso is a pleasant 39 year old male with PMH of IDDM type 2, HTN, PETE, HLD, vit D def, who presents today for follow up.      DM: Farxiga 10mg, trulicity 4.5mg, lantus 45U nightly. Last A1C 7.1  HTN: Triamterene/hydrochlorothiazide 37.5-25mg --> consider switching to SRB  PETE: Was using CPAP machine, lost weight, sleep has now improved, no longer using CPAP. Declined sleep study.   HLD: not on statin, LDL 15.   Vit D supplement: 1000U daily supplement     Using freestyle rory 3. Going to [facility] in 2 weeks.   Went to [facility] for Optometry. Due for follow up.     All systems have been reviewed and are negative for complaint other than those mentioned in the HPI.     Objective   /83 (BP Location: Left arm, Patient Position: Sitting, BP Cuff Size: Large adult)   Ht 1.778 m (5' 10\")   Wt 126 kg (277 lb)   BMI 39.75 kg/m²    Physical Exam  Constitutional:       General: He is awake.      Appearance: Normal appearance.   HENT:      Head: Normocephalic and atraumatic.   Eyes:      Extraocular Movements: Extraocular movements intact.      Pupils: Pupils are equal, round, and reactive to light.   Cardiovascular:      Rate and Rhythm: Normal rate and regular rhythm.      Heart sounds: S1 normal and S2 normal. No murmur heard.  Pulmonary:      Effort: Pulmonary effort is normal.      Breath sounds: Normal breath sounds.   Musculoskeletal:      Cervical back: Normal range of motion and neck supple.      Right lower leg: No edema.      Left lower leg: No edema.   Skin:     General: Skin is warm and dry.   Neurological:      General: No focal deficit present.      Mental Status: He is alert and oriented to person, place, and time.   Psychiatric:         Mood and Affect: Mood and affect normal.         Behavior: Behavior normal. Behavior is cooperative.         Thought Content: Thought content normal.         " "Judgment: Judgment normal.       Darshan was seen today for follow-up.  Diagnoses and all orders for this visit:  Type 2 diabetes mellitus without complication, with long-term current use of insulin (Multi) (Primary)  -     Running out of medication, difficulty obtaining trulicity.   - Taking Trulicity 4.5 and farxiga 10mg, lantus 48U nightly. A1C still above 7, not at goal  - Switch trulicity to mounjaro for better BG control  - Would benefit from nutrition education, referred to dietician.   - Referral to [facility]  -     Comprehensive Metabolic Panel; Future  -     Lipid Panel; Future  -     dulaglutide (Trulicity) 4.5 mg/0.5 mL pen injector; Inject 4.5 mg under the skin 1 (one) time per week.  -     dapagliflozin propanediol (Farxiga) 10 mg; Take 1 tablet (10 mg) by mouth once daily.  -     insulin glargine (Lantus) 100 unit/mL (3 mL) pen; Inject 47 Units under the skin once daily at bedtime. Take as directed per insulin instructions.  -     BD Ultra-Fine Mini Pen Needle 31 gauge x 3/16\" needle; Use to inject insulin once a night  -     alcohol swabs (Alcohol Pads) pads, medicated; Apply 1 Pad topically 3 times a day.  -     tirzepatide (Mounjaro) 2.5 mg/0.5 mL pen injector; Inject 2.5 mg under the skin 1 (one) time per week.  -     Albumin, urine, random; Future  Primary hypertension  -     Should likely be on ARB rather than diruetic. Does not want to switch at this time, will discuss at next visit.   - triamterene-hydrochlorothiazid (Dyazide) 37.5-25 mg capsule; Take 1 capsule by mouth once daily.  Vitamin D deficiency  -     cholecalciferol (Vitamin D-3) 25 MCG (1000 UT) tablet; Take 1 tablet (1,000 Units) by mouth once daily.  Need for Tdap vaccination  -     Tdap vaccine, age 7 years and older    Follow up in 3 months.   "

## 2024-05-31 ENCOUNTER — DOCUMENTATION (OUTPATIENT)
Dept: CARE COORDINATION | Facility: CLINIC | Age: 39
End: 2024-05-31
Payer: COMMERCIAL

## 2024-06-03 DIAGNOSIS — Z79.4 TYPE 2 DIABETES MELLITUS WITHOUT COMPLICATION, WITH LONG-TERM CURRENT USE OF INSULIN (MULTI): ICD-10-CM

## 2024-06-03 DIAGNOSIS — E11.9 TYPE 2 DIABETES MELLITUS WITHOUT COMPLICATION, WITH LONG-TERM CURRENT USE OF INSULIN (MULTI): ICD-10-CM

## 2024-06-03 RX ORDER — DAPAGLIFLOZIN 10 MG/1
10 TABLET, FILM COATED ORAL DAILY
Qty: 30 TABLET | Refills: 6 | Status: SHIPPED | OUTPATIENT
Start: 2024-06-03 | End: 2025-06-03

## 2024-06-03 RX ORDER — DAPAGLIFLOZIN 10 MG/1
10 TABLET, FILM COATED ORAL DAILY
Qty: 90 TABLET | Refills: 0 | Status: SHIPPED | OUTPATIENT
Start: 2024-06-03 | End: 2024-09-08

## 2024-06-04 ENCOUNTER — PHARMACY VISIT (OUTPATIENT)
Dept: PHARMACY | Facility: CLINIC | Age: 39
End: 2024-06-04
Payer: MEDICARE

## 2024-06-07 ENCOUNTER — PATIENT OUTREACH (OUTPATIENT)
Dept: CARE COORDINATION | Facility: CLINIC | Age: 39
End: 2024-06-07
Payer: COMMERCIAL

## 2024-06-07 NOTE — PROGRESS NOTES
"Attempted call to patient regarding referral for outpatient DSME:  received automated message \"not receiving calls at this time\".  Will attempt 2nd call.  "

## 2024-06-10 PROCEDURE — RXMED WILLOW AMBULATORY MEDICATION CHARGE

## 2024-06-14 ENCOUNTER — PHARMACY VISIT (OUTPATIENT)
Dept: PHARMACY | Facility: CLINIC | Age: 39
End: 2024-06-14
Payer: MEDICARE

## 2024-06-17 ENCOUNTER — PHARMACY VISIT (OUTPATIENT)
Dept: PHARMACY | Facility: CLINIC | Age: 39
End: 2024-06-17
Payer: MEDICARE

## 2024-06-24 ENCOUNTER — PATIENT OUTREACH (OUTPATIENT)
Dept: CARE COORDINATION | Facility: CLINIC | Age: 39
End: 2024-06-24
Payer: COMMERCIAL

## 2024-06-24 NOTE — PROGRESS NOTES
2nd Call:  patient answered, stated he is on vacation and when he returns to the [place] he will call to schedule.

## 2024-07-09 ENCOUNTER — DOCUMENTATION (OUTPATIENT)
Dept: CARE COORDINATION | Facility: CLINIC | Age: 39
End: 2024-07-09
Payer: COMMERCIAL

## 2024-07-10 ENCOUNTER — APPOINTMENT (OUTPATIENT)
Dept: CARE COORDINATION | Facility: CLINIC | Age: 39
End: 2024-07-10
Payer: COMMERCIAL

## 2024-07-26 ENCOUNTER — APPOINTMENT (OUTPATIENT)
Dept: PHARMACY | Facility: HOSPITAL | Age: 39
End: 2024-07-26
Payer: COMMERCIAL

## 2024-08-19 ENCOUNTER — APPOINTMENT (OUTPATIENT)
Dept: PRIMARY CARE | Facility: CLINIC | Age: 39
End: 2024-08-19
Payer: COMMERCIAL

## 2024-08-22 DIAGNOSIS — E11.9 TYPE 2 DIABETES MELLITUS WITHOUT COMPLICATION, WITH LONG-TERM CURRENT USE OF INSULIN (MULTI): ICD-10-CM

## 2024-08-22 DIAGNOSIS — Z79.4 TYPE 2 DIABETES MELLITUS WITHOUT COMPLICATION, WITH LONG-TERM CURRENT USE OF INSULIN (MULTI): ICD-10-CM

## 2024-08-23 ENCOUNTER — APPOINTMENT (OUTPATIENT)
Dept: PHARMACY | Facility: HOSPITAL | Age: 39
End: 2024-08-23
Payer: COMMERCIAL

## 2024-08-23 DIAGNOSIS — Z79.4 TYPE 2 DIABETES MELLITUS WITHOUT COMPLICATION, WITH LONG-TERM CURRENT USE OF INSULIN (MULTI): ICD-10-CM

## 2024-08-23 DIAGNOSIS — E11.9 TYPE 2 DIABETES MELLITUS WITHOUT COMPLICATION, WITH LONG-TERM CURRENT USE OF INSULIN (MULTI): ICD-10-CM

## 2024-08-23 PROCEDURE — RXMED WILLOW AMBULATORY MEDICATION CHARGE

## 2024-08-23 RX ORDER — TIRZEPATIDE 2.5 MG/.5ML
2.5 INJECTION, SOLUTION SUBCUTANEOUS
Qty: 2 ML | Refills: 0 | OUTPATIENT
Start: 2024-08-25

## 2024-08-23 RX ORDER — TIRZEPATIDE 2.5 MG/.5ML
2.5 INJECTION, SOLUTION SUBCUTANEOUS
Qty: 2 ML | Refills: 0 | Status: SHIPPED | OUTPATIENT
Start: 2024-08-23

## 2024-08-23 NOTE — TELEPHONE ENCOUNTER
Has appointment with clinical pharmacist today. Spoke with her, she will assess tolerance of current dose and, if tolerating well, will increase to 5mg/week.

## 2024-08-26 ENCOUNTER — PHARMACY VISIT (OUTPATIENT)
Dept: PHARMACY | Facility: CLINIC | Age: 39
End: 2024-08-26

## 2024-08-27 ENCOUNTER — PHARMACY VISIT (OUTPATIENT)
Dept: PHARMACY | Facility: CLINIC | Age: 39
End: 2024-08-27
Payer: MEDICARE

## 2024-08-30 ENCOUNTER — APPOINTMENT (OUTPATIENT)
Dept: PRIMARY CARE | Facility: CLINIC | Age: 39
End: 2024-08-30
Payer: COMMERCIAL

## 2024-09-09 ENCOUNTER — APPOINTMENT (OUTPATIENT)
Dept: PRIMARY CARE | Facility: CLINIC | Age: 39
End: 2024-09-09
Payer: COMMERCIAL

## 2024-09-09 ENCOUNTER — LAB (OUTPATIENT)
Dept: LAB | Facility: LAB | Age: 39
End: 2024-09-09
Payer: COMMERCIAL

## 2024-09-09 VITALS
DIASTOLIC BLOOD PRESSURE: 83 MMHG | SYSTOLIC BLOOD PRESSURE: 128 MMHG | HEIGHT: 70 IN | BODY MASS INDEX: 36.51 KG/M2 | WEIGHT: 255 LBS

## 2024-09-09 DIAGNOSIS — B37.9 CANDIDA INFECTION: ICD-10-CM

## 2024-09-09 DIAGNOSIS — Z79.4 TYPE 2 DIABETES MELLITUS WITHOUT COMPLICATION, WITH LONG-TERM CURRENT USE OF INSULIN (MULTI): Primary | ICD-10-CM

## 2024-09-09 DIAGNOSIS — E11.9 TYPE 2 DIABETES MELLITUS WITHOUT COMPLICATION, WITH LONG-TERM CURRENT USE OF INSULIN (MULTI): Primary | ICD-10-CM

## 2024-09-09 DIAGNOSIS — I10 PRIMARY HYPERTENSION: ICD-10-CM

## 2024-09-09 DIAGNOSIS — E11.9 TYPE 2 DIABETES MELLITUS WITHOUT COMPLICATION, WITH LONG-TERM CURRENT USE OF INSULIN (MULTI): ICD-10-CM

## 2024-09-09 DIAGNOSIS — Z23 FLU VACCINE NEED: ICD-10-CM

## 2024-09-09 DIAGNOSIS — Z79.4 TYPE 2 DIABETES MELLITUS WITHOUT COMPLICATION, WITH LONG-TERM CURRENT USE OF INSULIN (MULTI): ICD-10-CM

## 2024-09-09 LAB
APPEARANCE UR: CLEAR
BILIRUB UR STRIP.AUTO-MCNC: NEGATIVE MG/DL
COLOR UR: ABNORMAL
CREAT UR-MCNC: 60.5 MG/DL (ref 20–370)
GLUCOSE UR STRIP.AUTO-MCNC: ABNORMAL MG/DL
KETONES UR STRIP.AUTO-MCNC: ABNORMAL MG/DL
LEUKOCYTE ESTERASE UR QL STRIP.AUTO: NEGATIVE
MICROALBUMIN UR-MCNC: <7 MG/L
MICROALBUMIN/CREAT UR: NORMAL MG/G{CREAT}
NITRITE UR QL STRIP.AUTO: NEGATIVE
PH UR STRIP.AUTO: 5 [PH]
POC HEMOGLOBIN A1C: 14 % (ref 4.2–6.5)
PROT UR STRIP.AUTO-MCNC: NEGATIVE MG/DL
RBC # UR STRIP.AUTO: NEGATIVE /UL
SP GR UR STRIP.AUTO: 1.04
UROBILINOGEN UR STRIP.AUTO-MCNC: NORMAL MG/DL

## 2024-09-09 PROCEDURE — 3008F BODY MASS INDEX DOCD: CPT

## 2024-09-09 PROCEDURE — 80061 LIPID PANEL: CPT

## 2024-09-09 PROCEDURE — 3079F DIAST BP 80-89 MM HG: CPT

## 2024-09-09 PROCEDURE — 90471 IMMUNIZATION ADMIN: CPT

## 2024-09-09 PROCEDURE — 1036F TOBACCO NON-USER: CPT

## 2024-09-09 PROCEDURE — 90656 IIV3 VACC NO PRSV 0.5 ML IM: CPT

## 2024-09-09 PROCEDURE — 82043 UR ALBUMIN QUANTITATIVE: CPT

## 2024-09-09 PROCEDURE — 80053 COMPREHEN METABOLIC PANEL: CPT

## 2024-09-09 PROCEDURE — 3051F HG A1C>EQUAL 7.0%<8.0%: CPT

## 2024-09-09 PROCEDURE — 83036 HEMOGLOBIN GLYCOSYLATED A1C: CPT

## 2024-09-09 PROCEDURE — 36415 COLL VENOUS BLD VENIPUNCTURE: CPT

## 2024-09-09 PROCEDURE — 81003 URINALYSIS AUTO W/O SCOPE: CPT

## 2024-09-09 PROCEDURE — 82570 ASSAY OF URINE CREATININE: CPT

## 2024-09-09 PROCEDURE — 3074F SYST BP LT 130 MM HG: CPT

## 2024-09-09 PROCEDURE — 99214 OFFICE O/P EST MOD 30 MIN: CPT

## 2024-09-09 RX ORDER — DAPAGLIFLOZIN 10 MG/1
10 TABLET, FILM COATED ORAL DAILY
Qty: 90 TABLET | Refills: 0 | Status: SHIPPED | OUTPATIENT
Start: 2024-09-09 | End: 2024-12-08

## 2024-09-09 RX ORDER — BLOOD SUGAR DIAGNOSTIC
1 STRIP MISCELLANEOUS 3 TIMES DAILY
Qty: 100 STRIP | Refills: 3 | Status: SHIPPED | OUTPATIENT
Start: 2024-09-09

## 2024-09-09 RX ORDER — BLOOD-GLUCOSE SENSOR
EACH MISCELLANEOUS
Qty: 2 EACH | Refills: 3 | Status: SHIPPED | OUTPATIENT
Start: 2024-09-09 | End: 2024-09-13

## 2024-09-09 RX ORDER — DOXYLAMINE SUCCINATE 25 MG
TABLET ORAL 2 TIMES DAILY
Qty: 57 G | Refills: 0 | Status: SHIPPED | OUTPATIENT
Start: 2024-09-09

## 2024-09-09 RX ORDER — INSULIN GLARGINE 100 [IU]/ML
47 INJECTION, SOLUTION SUBCUTANEOUS NIGHTLY
Qty: 5 EACH | Refills: 0 | Status: SHIPPED | OUTPATIENT
Start: 2024-09-09 | End: 2025-09-09

## 2024-09-09 RX ORDER — TIRZEPATIDE 5 MG/.5ML
5 INJECTION, SOLUTION SUBCUTANEOUS WEEKLY
Qty: 2 ML | Refills: 0 | Status: SHIPPED | OUTPATIENT
Start: 2024-09-09

## 2024-09-09 RX ORDER — PEN NEEDLE, DIABETIC 31 GX5/16"
NEEDLE, DISPOSABLE MISCELLANEOUS
Qty: 90 EACH | Refills: 3 | Status: SHIPPED | OUTPATIENT
Start: 2024-09-09

## 2024-09-09 RX ORDER — TRIAMTERENE AND HYDROCHLOROTHIAZIDE 37.5; 25 MG/1; MG/1
1 CAPSULE ORAL DAILY
Qty: 90 CAPSULE | Refills: 0 | Status: SHIPPED | OUTPATIENT
Start: 2024-09-09

## 2024-09-09 ASSESSMENT — LIFESTYLE VARIABLES: HOW OFTEN DO YOU HAVE A DRINK CONTAINING ALCOHOL: 2-4 TIMES A MONTH

## 2024-09-09 ASSESSMENT — PATIENT HEALTH QUESTIONNAIRE - PHQ9
SUM OF ALL RESPONSES TO PHQ9 QUESTIONS 1 AND 2: 0
2. FEELING DOWN, DEPRESSED OR HOPELESS: NOT AT ALL
1. LITTLE INTEREST OR PLEASURE IN DOING THINGS: NOT AT ALL

## 2024-09-09 NOTE — PROGRESS NOTES
"Subjective   Patient ID: Darshanlesly Barroso is a 39 y.o. male who presents for Personal Problem (After intercouse red ithy penis.).  HPI  Mr. Barroso is a pleasant 39 year old male with PMH of IDDM type 2, HTN, PETE, HLD, vit D def, who presents today for follow up.      DM: Farxiga 10mg, mounjaro 5mg, lantus 50U nightly  HTN: Triamterene/hydrochlorothiazide 37.5-25mg  PETE: Was using CPAP machine, lost weight, sleep has now improved, no longer using CPAP. Declined sleep study.   HLD: not on statin, LDL 15.   Vit D supplement: 1000U daily supplement     Reports that he was eating \"whatever\" when on vacation.     All systems have been reviewed and are negative for complaint other than those mentioned in the HPI.     Objective   /83 (BP Location: Left arm, Patient Position: Sitting, BP Cuff Size: Large adult)   Ht 1.778 m (5' 10\")   Wt 116 kg (255 lb)   BMI 36.59 kg/m²    Physical Exam  Constitutional:       General: He is awake.      Appearance: Normal appearance.   HENT:      Head: Normocephalic and atraumatic.   Eyes:      Extraocular Movements: Extraocular movements intact.      Pupils: Pupils are equal, round, and reactive to light.   Cardiovascular:      Rate and Rhythm: Normal rate and regular rhythm.      Heart sounds: S1 normal and S2 normal. No murmur heard.  Pulmonary:      Effort: Pulmonary effort is normal.      Breath sounds: Normal breath sounds.   Musculoskeletal:      Cervical back: Normal range of motion and neck supple.      Right lower leg: No edema.      Left lower leg: No edema.   Skin:     General: Skin is warm and dry.   Neurological:      General: No focal deficit present.      Mental Status: He is alert and oriented to person, place, and time.   Psychiatric:         Mood and Affect: Mood and affect normal.         Behavior: Behavior normal. Behavior is cooperative.         Thought Content: Thought content normal.         Judgment: Judgment normal.     Darshan was seen today for personal " "problem.  Diagnoses and all orders for this visit:  Type 2 diabetes mellitus without complication, with long-term current use of insulin (Multi) (Primary)  -   Patient had transitioned from trulicity to mounjaro and stopped checking BG. Likely due to transition from 4.5mg trulicity to 2.5mg mounjaro, needed additional BG control  - At the same time his eating habits began including high carbohydrate foods   - Did not titrate up as planned, patient stayed on 2.5mg dose of mounjaro.   - Will plan to titrate up on Mounjaro. Increase insulin to 50U nightly. START checking BG at least for fasting blood glucose every day and one post prandial check.   - Reestablish care with clinical pharmacy team for increased surveilance titrating medications.   -   insulin glargine (Lantus) 100 unit/mL (3 mL) pen; Inject 47 Units under the skin once daily at bedtime. Take as directed per insulin instructions.  -     dapagliflozin propanediol (Farxiga) 10 mg; Take 1 tablet (10 mg) by mouth once daily.  -     tirzepatide (Mounjaro) 5 mg/0.5 mL pen injector; Inject 5 mg under the skin 1 (one) time per week.  -     FreeStyle Test strip; 1 strip 3 times a day.  -     BD Ultra-Fine Mini Pen Needle 31 gauge x 3/16\" needle; Use to inject insulin once a night  -     Referral to Clinical Pharmacy; Future  -     blood-glucose sensor (FreeStyle Susan 3 Sensor) device; Use 1 sensor for 14 days to monitor blood sugar.  -     Referral to Endocrinology; Future  -     POCT glycosylated hemoglobin (Hb A1C) manually resulted  Flu vaccine need  -     Flu vaccine, trivalent, preservative free, age 6 months and greater (Fluraix/Fluzone/Flulaval)  Candida infection  -     Urinalysis with Reflex Microscopic; Future  -     miconazole (Micatin) 2 % cream; Apply topically 2 times a day.  Primary hypertension  -    Stable. Continue current management.    -  triamterene-hydrochlorothiazid (Dyazide) 37.5-25 mg capsule; Take 1 capsule by mouth once daily.    Due for " labs, patient to have checked  Follow up in 1 month with blood glucose log.

## 2024-09-10 LAB
ALBUMIN SERPL BCP-MCNC: 4.7 G/DL (ref 3.4–5)
ALP SERPL-CCNC: 141 U/L (ref 33–120)
ALT SERPL W P-5'-P-CCNC: 29 U/L (ref 10–52)
ANION GAP SERPL CALC-SCNC: 14 MMOL/L (ref 10–20)
AST SERPL W P-5'-P-CCNC: 19 U/L (ref 9–39)
BILIRUB SERPL-MCNC: 0.5 MG/DL (ref 0–1.2)
BUN SERPL-MCNC: 14 MG/DL (ref 6–23)
CALCIUM SERPL-MCNC: 9.9 MG/DL (ref 8.6–10.6)
CHLORIDE SERPL-SCNC: 98 MMOL/L (ref 98–107)
CHOLEST SERPL-MCNC: 184 MG/DL (ref 0–199)
CHOLESTEROL/HDL RATIO: 4
CO2 SERPL-SCNC: 28 MMOL/L (ref 21–32)
CREAT SERPL-MCNC: 1.14 MG/DL (ref 0.5–1.3)
EGFRCR SERPLBLD CKD-EPI 2021: 83 ML/MIN/1.73M*2
GLUCOSE SERPL-MCNC: 385 MG/DL (ref 74–99)
HDLC SERPL-MCNC: 46.4 MG/DL
LDLC SERPL CALC-MCNC: 71 MG/DL
NON HDL CHOLESTEROL: 138 MG/DL (ref 0–149)
POTASSIUM SERPL-SCNC: 4.4 MMOL/L (ref 3.5–5.3)
PROT SERPL-MCNC: 8 G/DL (ref 6.4–8.2)
SODIUM SERPL-SCNC: 136 MMOL/L (ref 136–145)
TRIGL SERPL-MCNC: 332 MG/DL (ref 0–149)
VLDL: 66 MG/DL (ref 0–40)

## 2024-09-13 DIAGNOSIS — E11.9 TYPE 2 DIABETES MELLITUS WITHOUT COMPLICATION, WITH LONG-TERM CURRENT USE OF INSULIN (MULTI): Primary | ICD-10-CM

## 2024-09-13 DIAGNOSIS — Z79.4 TYPE 2 DIABETES MELLITUS WITHOUT COMPLICATION, WITH LONG-TERM CURRENT USE OF INSULIN (MULTI): Primary | ICD-10-CM

## 2024-09-13 RX ORDER — BLOOD-GLUCOSE SENSOR
EACH MISCELLANEOUS
Qty: 3 EACH | Refills: 2 | Status: SHIPPED | OUTPATIENT
Start: 2024-09-13

## 2024-09-16 ENCOUNTER — DOCUMENTATION (OUTPATIENT)
Dept: CARE COORDINATION | Facility: CLINIC | Age: 39
End: 2024-09-16

## 2024-09-16 ENCOUNTER — APPOINTMENT (OUTPATIENT)
Dept: PHARMACY | Facility: HOSPITAL | Age: 39
End: 2024-09-16
Payer: COMMERCIAL

## 2024-09-16 DIAGNOSIS — Z79.4 TYPE 2 DIABETES MELLITUS WITHOUT COMPLICATION, WITH LONG-TERM CURRENT USE OF INSULIN (MULTI): Primary | ICD-10-CM

## 2024-09-16 DIAGNOSIS — E55.9 VITAMIN D DEFICIENCY: ICD-10-CM

## 2024-09-16 DIAGNOSIS — E11.9 TYPE 2 DIABETES MELLITUS WITHOUT COMPLICATION, WITH LONG-TERM CURRENT USE OF INSULIN (MULTI): Primary | ICD-10-CM

## 2024-09-16 RX ORDER — CHOLECALCIFEROL (VITAMIN D3) 25 MCG
1000 TABLET ORAL DAILY
Qty: 90 TABLET | Refills: 3 | Status: SHIPPED | OUTPATIENT
Start: 2024-09-16

## 2024-09-16 ASSESSMENT — ENCOUNTER SYMPTOMS
POLYDIPSIA: 1
WEIGHT LOSS: 1

## 2024-09-16 NOTE — PROGRESS NOTES
[facility] Pharmacy Consult  Darshan Barroso is a 39 y.o. male was referred to Clinical Pharmacy Team for a Pharmacy consult.  The patient was referred for their Diabetes.    Referring Provider: ABIODUN Fontanez-CNP    Subjective   Allergies   Allergen Reactions    Bee Pollen Unknown    Canagliflozin Unknown     Pt went to hospital with ketoacidosis from this Rx    Metformin Diarrhea    Phenylephrine-Guaifenesin Unknown     Lightheadedness       [id number] - [place], [place] - [address] [facility]  [address] [facility] [address]  Phone: [phone] Fax: [phone]    [facility] - [place], [place] - [address] [facility] [address] [facility]  [address] [facility] [address]  Phone: [phone] Fax: [phone]    [facility]  [address], [address]  [facility] [address]  Phone: [phone] Fax: [phone]      Diabetes  He presents for his follow-up diabetic visit. He has type 2 diabetes mellitus. His disease course has been worsening. Associated symptoms include polydipsia, polyuria and weight loss. There are no hypoglycemic complications. Symptoms are worsening. There are no diabetic complications. Risk factors for coronary artery disease include diabetes mellitus, dyslipidemia and hypertension. He is compliant with treatment most of the time. He is following a generally unhealthy diet. He rarely participates in exercise. His overall blood glucose range is >200 mg/dl. An ACE inhibitor/angiotensin II receptor blocker is being taken.     A1c 14%, went on vacation back at June, diet has been very bad. PCP increased Lantus to 40 units and Mounjaro to 5mg last week.    Review of Systems   Constitutional:  Positive for weight loss.   Endocrine: Positive for polydipsia and polyuria.       Objective     There were no vitals taken for this visit.     LAB  Lab Results   Component Value Date    BILITOT 0.5 09/09/2024    CALCIUM 9.9 09/09/2024    CO2  "28 09/09/2024    CL 98 09/09/2024    CREATININE 1.14 09/09/2024    GLUCOSE 385 (H) 09/09/2024    ALKPHOS 141 (H) 09/09/2024    K 4.4 09/09/2024    PROT 8.0 09/09/2024     09/09/2024    AST 19 09/09/2024    ALT 29 09/09/2024    BUN 14 09/09/2024    ANIONGAP 14 09/09/2024    MG 1.90 12/27/2021    PHOS 4.1 01/14/2021    ALBUMIN 4.7 09/09/2024    LIPASE 49 12/26/2021    GFRMALE >90 05/15/2023     Lab Results   Component Value Date    TRIG 332 (H) 09/09/2024    CHOL 184 09/09/2024    LDLCALC 71 09/09/2024    HDL 46.4 09/09/2024     Lab Results   Component Value Date    HGBA1C 14 (A) 09/09/2024       Current Outpatient Medications on File Prior to Visit   Medication Sig Dispense Refill    alcohol swabs (Alcohol Pads) pads, medicated Apply 1 Pad topically 3 times a day. 100 each 2    BD Ultra-Fine Mini Pen Needle 31 gauge x 3/16\" needle Use to inject insulin once a night 90 each 3    dapagliflozin propanediol (Farxiga) 10 mg Take 1 tablet (10 mg) by mouth once daily. 90 tablet 0    Dexcom G7 Sensor device Use to monitor blood sugar. Change sensor every 10 days. 3 each 2    FreeStyle Test strip 1 strip 3 times a day. 100 strip 3    insulin glargine (Lantus) 100 unit/mL (3 mL) pen Inject 47 Units under the skin once daily at bedtime. Take as directed per insulin instructions. 5 each 0    miconazole (Micatin) 2 % cream Apply topically 2 times a day. 57 g 0    tirzepatide (Mounjaro) 5 mg/0.5 mL pen injector Inject 5 mg under the skin 1 (one) time per week. 2 mL 0    triamterene-hydrochlorothiazid (Dyazide) 37.5-25 mg capsule Take 1 capsule by mouth once daily. 90 capsule 0    [DISCONTINUED] blood-glucose sensor (FreeStyle Susan 3 Sensor) device Use 1 sensor for 14 days to monitor blood sugar. 2 each 3    [DISCONTINUED] cholecalciferol (Vitamin D-3) 25 MCG (1000 UT) tablet Take 1 tablet (1,000 Units) by mouth once daily. 90 tablet 0     No current facility-administered medications on file prior to visit.        HISTORICAL " PHARMACOTHERAPY  -none    DRUG INTERACTIONS  - none    SECONDARY PREVENTION  - Statin? yes  - ACE-I/ARB? yes    Assessment/Plan   Problem List Items Addressed This Visit       Vitamin D deficiency    Relevant Medications    cholecalciferol (Vitamin D-3) 25 MCG (1000 UT) tablet    Type 2 diabetes mellitus (Multi) - Primary     CONTINUE all medications as prescribed  DM uncontrolled; last A1c of 14%. Recently changes made by PCP, will not make changes so quick  Educate on diet and exercise  FUV in 4 weeks             Continue all meds under the continuation of care with the referring provider and clinical pharmacy team.    Dimas Gama PharmD     Verbal consent to manage patient's drug therapy was obtained from [the patient and/or an individual authorized to act on behalf of a patient]. They were informed they may decline to participate or withdraw from participation in pharmacy services at any time.

## 2024-09-16 NOTE — ASSESSMENT & PLAN NOTE
CONTINUE all medications as prescribed  DM uncontrolled; last A1c of 14%. Recently changes made by PCP, will not make changes so quick  Educate on diet and exercise  FUV in 4 weeks

## 2024-10-07 ENCOUNTER — APPOINTMENT (OUTPATIENT)
Dept: PRIMARY CARE | Facility: CLINIC | Age: 39
End: 2024-10-07
Payer: COMMERCIAL

## 2024-10-07 ENCOUNTER — TELEMEDICINE (OUTPATIENT)
Dept: PHARMACY | Facility: HOSPITAL | Age: 39
End: 2024-10-07
Payer: COMMERCIAL

## 2024-10-07 DIAGNOSIS — Z79.4 TYPE 2 DIABETES MELLITUS WITHOUT COMPLICATION, WITH LONG-TERM CURRENT USE OF INSULIN (MULTI): Primary | ICD-10-CM

## 2024-10-07 DIAGNOSIS — E11.9 TYPE 2 DIABETES MELLITUS WITHOUT COMPLICATION, WITH LONG-TERM CURRENT USE OF INSULIN (MULTI): Primary | ICD-10-CM

## 2024-10-07 RX ORDER — TIRZEPATIDE 7.5 MG/.5ML
7.5 INJECTION, SOLUTION SUBCUTANEOUS WEEKLY
Qty: 2 ML | Refills: 3 | Status: SHIPPED | OUTPATIENT
Start: 2024-10-07

## 2024-10-07 NOTE — ASSESSMENT & PLAN NOTE
INCREASE dose of Mounjaro to 7.5mg subcutaneous once weekly  Educate on side effects of dose increase  Reinforce patient that to maximize weight loss and prevent return weight gain, patient has to combine GLP-1 with good diet and exercise  DASH Diet  150 mins per week of exercise  FUV in 4 weeks regarding side effects and dose titration

## 2024-10-07 NOTE — PROGRESS NOTES
[facility] Pharmacy Consult  Darshan Barroso is a 39 y.o. male was referred to Clinical Pharmacy Team for a Pharmacy consult.  The patient was referred for their Diabetes.    Referring Provider: ABIODUN Fontanez-CNP    Subjective   Allergies   Allergen Reactions    Bee Pollen Unknown    Canagliflozin Unknown     Pt went to hospital with ketoacidosis from this Rx    Metformin Diarrhea    Phenylephrine-Guaifenesin Unknown     Lightheadedness       [id number] - [place], [place] - [address] [facility]  [address] [facility] [address]  Phone: [phone] Fax: [phone]    [facility] - [place], [place] - [address] [facility]  [address] [facility] [address]  Phone: [phone] Fax: [phone]    [facility]  [address], [address]  [facility] [address]  Phone: [phone] Fax: [phone]      Diabetes  He presents for his follow-up diabetic visit. He has type 2 diabetes mellitus. His disease course has been stable. There are no hypoglycemic associated symptoms. There are no hypoglycemic complications. Symptoms are stable. There are no diabetic complications. Current diabetic treatment includes insulin injections and oral agent (monotherapy). He is compliant with treatment all of the time. His home blood glucose trend is decreasing steadily. His overall blood glucose range is  mg/dl.     Denies n/v or stomach upset.    Time in range: 30 days  Very high: 12%  Above: 30%  Time: 58%  Low: 0%    Average: 175 mg/dL    Review of Systems    Objective     There were no vitals taken for this visit.     LAB  Lab Results   Component Value Date    BILITOT 0.5 09/09/2024    CALCIUM 9.9 09/09/2024    CO2 28 09/09/2024    CL 98 09/09/2024    CREATININE 1.14 09/09/2024    GLUCOSE 385 (H) 09/09/2024    ALKPHOS 141 (H) 09/09/2024    K 4.4 09/09/2024    PROT 8.0 09/09/2024     09/09/2024    AST 19 09/09/2024    ALT 29 09/09/2024     "BUN 14 09/09/2024    ANIONGAP 14 09/09/2024    MG 1.90 12/27/2021    PHOS 4.1 01/14/2021    ALBUMIN 4.7 09/09/2024    LIPASE 49 12/26/2021    GFRMALE >90 05/15/2023     Lab Results   Component Value Date    TRIG 332 (H) 09/09/2024    CHOL 184 09/09/2024    LDLCALC 71 09/09/2024    HDL 46.4 09/09/2024     Lab Results   Component Value Date    HGBA1C 14 (A) 09/09/2024       Current Outpatient Medications on File Prior to Visit   Medication Sig Dispense Refill    alcohol swabs (Alcohol Pads) pads, medicated Apply 1 Pad topically 3 times a day. 100 each 2    BD Ultra-Fine Mini Pen Needle 31 gauge x 3/16\" needle Use to inject insulin once a night 90 each 3    cholecalciferol (Vitamin D-3) 25 MCG (1000 UT) tablet Take 1 tablet (1,000 Units) by mouth once daily. 90 tablet 3    dapagliflozin propanediol (Farxiga) 10 mg Take 1 tablet (10 mg) by mouth once daily. 90 tablet 0    Dexcom G7 Sensor device Use to monitor blood sugar. Change sensor every 10 days. 3 each 2    FreeStyle Test strip 1 strip 3 times a day. 100 strip 3    insulin glargine (Lantus) 100 unit/mL (3 mL) pen Inject 47 Units under the skin once daily at bedtime. Take as directed per insulin instructions. 5 each 0    miconazole (Micatin) 2 % cream Apply topically 2 times a day. 57 g 0    triamterene-hydrochlorothiazid (Dyazide) 37.5-25 mg capsule Take 1 capsule by mouth once daily. 90 capsule 0    [DISCONTINUED] tirzepatide (Mounjaro) 5 mg/0.5 mL pen injector Inject 5 mg under the skin 1 (one) time per week. 2 mL 0     No current facility-administered medications on file prior to visit.        HISTORICAL PHARMACOTHERAPY  -none    DRUG INTERACTIONS  - none    SECONDARY PREVENTION  - Statin? no  - ACE-I/ARB? no    Assessment/Plan   Problem List Items Addressed This Visit       Type 2 diabetes mellitus - Primary     INCREASE dose of Mounjaro to 7.5mg subcutaneous once weekly  Educate on side effects of dose increase  Reinforce patient that to maximize weight loss " and prevent return weight gain, patient has to combine GLP-1 with good diet and exercise  DASH Diet  150 mins per week of exercise  FUV in 4 weeks regarding side effects and dose titration         Relevant Medications    tirzepatide (Mounjaro) 7.5 mg/0.5 mL pen injector        Continue all meds under the continuation of care with the referring provider and clinical pharmacy team.    Dimas Gama, Karen     Verbal consent to manage patient's drug therapy was obtained from [the patient and/or an individual authorized to act on behalf of a patient]. They were informed they may decline to participate or withdraw from participation in pharmacy services at any time.

## 2024-10-14 ENCOUNTER — APPOINTMENT (OUTPATIENT)
Dept: PHARMACY | Facility: HOSPITAL | Age: 39
End: 2024-10-14
Payer: COMMERCIAL

## 2024-10-14 DIAGNOSIS — E11.9 TYPE 2 DIABETES MELLITUS WITHOUT COMPLICATION, WITH LONG-TERM CURRENT USE OF INSULIN (MULTI): ICD-10-CM

## 2024-10-14 DIAGNOSIS — Z79.4 TYPE 2 DIABETES MELLITUS WITHOUT COMPLICATION, WITH LONG-TERM CURRENT USE OF INSULIN (MULTI): ICD-10-CM

## 2024-10-14 NOTE — PROGRESS NOTES
Clinical Pharmacy Appointment    Darshan Barroso is a 39 y.o. male was referred to Clinical Pharmacy Team for a Pharmacy consult.  The patient was referred for their Diabetes.    Referring Provider: ABIODUN Fontanez-CNP    Subjective   Allergies   Allergen Reactions    Bee Pollen Unknown    Canagliflozin Unknown     Pt went to hospital with ketoacidosis from this Rx    Metformin Diarrhea    Phenylephrine-Guaifenesin Unknown     Lightheadedness       [id number] - [place], [place] - [address] [facility]  [address] [facility] [address]  Phone: [phone] Fax: [phone]    [facility] - [place], [place] - [address] [facility]  [address] [facility] [address]  Phone: [phone] Fax: [phone]    [facility]  [address]  [facility] [address]  Phone: [phone] Fax: [phone]      Diabetes  He presents for his follow-up diabetic visit. He has type 2 diabetes mellitus. His disease course has been stable. There are no hypoglycemic associated symptoms. There are no hypoglycemic complications. Symptoms are stable. There are no diabetic complications. Current diabetic treatment includes insulin injections and oral agent (monotherapy). He is compliant with treatment all of the time. His home blood glucose trend is decreasing steadily. His overall blood glucose range is  mg/dl.     Denies n/v or stomach upset.    Time in range: 30 days  Very high: 4%  High: 7%  Time in range: 92%  Low: 0%    Average: 137 mg/dL    Review of Systems    Objective     There were no vitals taken for this visit.     LAB  Lab Results   Component Value Date    BILITOT 0.5 09/09/2024    CALCIUM 9.9 09/09/2024    CO2 28 09/09/2024    CL 98 09/09/2024    CREATININE 1.14 09/09/2024    GLUCOSE 385 (H) 09/09/2024    ALKPHOS 141 (H) 09/09/2024    K 4.4 09/09/2024    PROT 8.0 09/09/2024     09/09/2024    AST 19 09/09/2024    ALT 29  "09/09/2024    BUN 14 09/09/2024    ANIONGAP 14 09/09/2024    MG 1.90 12/27/2021    PHOS 4.1 01/14/2021    ALBUMIN 4.7 09/09/2024    LIPASE 49 12/26/2021    GFRMALE >90 05/15/2023     Lab Results   Component Value Date    TRIG 332 (H) 09/09/2024    CHOL 184 09/09/2024    LDLCALC 71 09/09/2024    HDL 46.4 09/09/2024     Lab Results   Component Value Date    HGBA1C 14 (A) 09/09/2024       Current Outpatient Medications on File Prior to Visit   Medication Sig Dispense Refill    alcohol swabs (Alcohol Pads) pads, medicated Apply 1 Pad topically 3 times a day. 100 each 2    BD Ultra-Fine Mini Pen Needle 31 gauge x 3/16\" needle Use to inject insulin once a night 90 each 3    cholecalciferol (Vitamin D-3) 25 MCG (1000 UT) tablet Take 1 tablet (1,000 Units) by mouth once daily. 90 tablet 3    dapagliflozin propanediol (Farxiga) 10 mg Take 1 tablet (10 mg) by mouth once daily. 90 tablet 0    Dexcom G7 Sensor device Use to monitor blood sugar. Change sensor every 10 days. 3 each 2    FreeStyle Test strip 1 strip 3 times a day. 100 strip 3    insulin glargine (Lantus) 100 unit/mL (3 mL) pen Inject 47 Units under the skin once daily at bedtime. Take as directed per insulin instructions. 5 each 0    miconazole (Micatin) 2 % cream Apply topically 2 times a day. 57 g 0    tirzepatide (Mounjaro) 7.5 mg/0.5 mL pen injector Inject 7.5 mg under the skin 1 (one) time per week. 2 mL 3    triamterene-hydrochlorothiazid (Dyazide) 37.5-25 mg capsule Take 1 capsule by mouth once daily. 90 capsule 0     No current facility-administered medications on file prior to visit.        HISTORICAL PHARMACOTHERAPY  -none    DRUG INTERACTIONS  - none    SECONDARY PREVENTION  - Statin? no  - ACE-I/ARB? no    Assessment/Plan   Problem List Items Addressed This Visit       Type 2 diabetes mellitus      DISCUSSION/PLAN:  Patient with diabetes that needs improvement, most recent A1c of 14% in September 2024 (goal < 7%). Patient has continuous blood glucose " monitoring with Dexcom G7. He reports an average blood glucose of 137 mg/dL and 92% of time in range. Patient is willing to set up Dexcom Clarity data sharing once he resets his password. He denies any hypoglycemia, missed doses, or side effects at this time. Will continue current regimen as Mounjaro was just increased last week.    CONTINUE:  Farxiga 10 mg once daily  insulin glargine 50 units at bedtime  Mounjaro 7.5 mg once weekly  Healthy diet choices and regular exercise    Future Considerations:  Mounjaro dose titration  Insulin glargine dose adjustments/decrease  Set up Dexcom Clarity data sharing    Monitoring and Education:  Reviewed Mounjaro dosing, administration, and monitoring  Answered all patient questions    Continue all meds under the continuation of care with the referring provider and clinical pharmacy team.    Clinical Pharmacist follow-up: November 4th, 2024 at 3:30 PM with Dimas Gama  Orders placed: none at this time    Thank you,   Margaret Yusuf, PharmD  Clinical Pharmacy Specialist, Primary Care   [phone]    Verbal consent to manage patient's drug therapy was obtained from the patient . Patient was informed he  may decline to participate or withdraw from participation in pharmacy services at any time.

## 2024-10-21 DIAGNOSIS — Z79.4 TYPE 2 DIABETES MELLITUS WITHOUT COMPLICATION, WITH LONG-TERM CURRENT USE OF INSULIN (MULTI): ICD-10-CM

## 2024-10-21 DIAGNOSIS — E11.9 TYPE 2 DIABETES MELLITUS WITHOUT COMPLICATION, WITH LONG-TERM CURRENT USE OF INSULIN (MULTI): ICD-10-CM

## 2024-10-21 RX ORDER — INSULIN GLARGINE 100 [IU]/ML
47 INJECTION, SOLUTION SUBCUTANEOUS NIGHTLY
Qty: 5 EACH | Refills: 1 | Status: SHIPPED | OUTPATIENT
Start: 2024-10-21 | End: 2025-10-21

## 2024-11-04 ENCOUNTER — OFFICE VISIT (OUTPATIENT)
Dept: PRIMARY CARE | Facility: CLINIC | Age: 39
End: 2024-11-04
Payer: COMMERCIAL

## 2024-11-04 ENCOUNTER — APPOINTMENT (OUTPATIENT)
Dept: PHARMACY | Facility: HOSPITAL | Age: 39
End: 2024-11-04
Payer: COMMERCIAL

## 2024-11-04 VITALS
BODY MASS INDEX: 37.65 KG/M2 | SYSTOLIC BLOOD PRESSURE: 114 MMHG | HEIGHT: 70 IN | WEIGHT: 263 LBS | DIASTOLIC BLOOD PRESSURE: 76 MMHG

## 2024-11-04 DIAGNOSIS — Z79.4 TYPE 2 DIABETES MELLITUS WITHOUT COMPLICATION, WITH LONG-TERM CURRENT USE OF INSULIN (MULTI): Primary | ICD-10-CM

## 2024-11-04 DIAGNOSIS — E11.9 TYPE 2 DIABETES MELLITUS WITHOUT COMPLICATION, WITH LONG-TERM CURRENT USE OF INSULIN (MULTI): Primary | ICD-10-CM

## 2024-11-04 DIAGNOSIS — E55.9 VITAMIN D DEFICIENCY: ICD-10-CM

## 2024-11-04 DIAGNOSIS — I10 PRIMARY HYPERTENSION: ICD-10-CM

## 2024-11-04 PROCEDURE — 3078F DIAST BP <80 MM HG: CPT

## 2024-11-04 PROCEDURE — 1036F TOBACCO NON-USER: CPT

## 2024-11-04 PROCEDURE — 99214 OFFICE O/P EST MOD 30 MIN: CPT

## 2024-11-04 PROCEDURE — 3008F BODY MASS INDEX DOCD: CPT

## 2024-11-04 PROCEDURE — 3048F LDL-C <100 MG/DL: CPT

## 2024-11-04 PROCEDURE — 3074F SYST BP LT 130 MM HG: CPT

## 2024-11-04 PROCEDURE — 3051F HG A1C>EQUAL 7.0%<8.0%: CPT

## 2024-11-04 PROCEDURE — 3062F POS MACROALBUMINURIA REV: CPT

## 2024-11-04 RX ORDER — TIRZEPATIDE 10 MG/.5ML
10 INJECTION, SOLUTION SUBCUTANEOUS WEEKLY
Qty: 2 ML | Refills: 3 | Status: SHIPPED | OUTPATIENT
Start: 2024-11-04

## 2024-11-04 RX ORDER — TRIAMTERENE AND HYDROCHLOROTHIAZIDE 37.5; 25 MG/1; MG/1
1 CAPSULE ORAL DAILY
Qty: 90 CAPSULE | Refills: 0 | Status: SHIPPED | OUTPATIENT
Start: 2024-11-04

## 2024-11-04 RX ORDER — CHOLECALCIFEROL (VITAMIN D3) 25 MCG
1000 TABLET ORAL DAILY
Qty: 90 TABLET | Refills: 3 | Status: SHIPPED | OUTPATIENT
Start: 2024-11-04

## 2024-11-04 RX ORDER — INSULIN GLARGINE 100 [IU]/ML
50 INJECTION, SOLUTION SUBCUTANEOUS NIGHTLY
Qty: 5 EACH | Refills: 1 | Status: SHIPPED | OUTPATIENT
Start: 2024-11-04 | End: 2025-11-04

## 2024-11-04 RX ORDER — BLOOD-GLUCOSE SENSOR
EACH MISCELLANEOUS
Qty: 3 EACH | Refills: 2 | Status: SHIPPED | OUTPATIENT
Start: 2024-11-04

## 2024-11-04 RX ORDER — DAPAGLIFLOZIN 10 MG/1
10 TABLET, FILM COATED ORAL DAILY
Qty: 90 TABLET | Refills: 0 | Status: SHIPPED | OUTPATIENT
Start: 2024-11-04 | End: 2025-02-02

## 2024-11-04 RX ORDER — PEN NEEDLE, DIABETIC 31 GX5/16"
NEEDLE, DISPOSABLE MISCELLANEOUS
Qty: 90 EACH | Refills: 3 | Status: SHIPPED | OUTPATIENT
Start: 2024-11-04

## 2024-11-04 ASSESSMENT — LIFESTYLE VARIABLES: HOW MANY STANDARD DRINKS CONTAINING ALCOHOL DO YOU HAVE ON A TYPICAL DAY: PATIENT DOES NOT DRINK

## 2024-11-04 ASSESSMENT — ENCOUNTER SYMPTOMS: DIABETIC ASSOCIATED SYMPTOMS: 0

## 2024-11-04 NOTE — ASSESSMENT & PLAN NOTE
INCREASE dose of Mounjaro to 10mg subcutaneous once weekly  Decrease Lantus to 44 units nightly  Educate on side effects of dose increase  Reinforce patient that to maximize weight loss and prevent return weight gain, patient has to combine GLP-1 with good diet and exercise  DASH Diet  150 mins per week of exercise  FUV in 7 weeks regarding side effects and dose titration

## 2024-11-04 NOTE — PROGRESS NOTES
[facility] Pharmacy Consult  Darshan Barroso is a 39 y.o. male was referred to Clinical Pharmacy Team for a Pharmacy consult.  The patient was referred for their Diabetes.    Referring Provider: ABIODUN Fontanez-CNP    Subjective   Allergies   Allergen Reactions    Bee Pollen Unknown    Canagliflozin Unknown     Pt went to hospital with ketoacidosis from this Rx    Metformin Diarrhea    Phenylephrine-Guaifenesin Unknown     Lightheadedness       [id number] - [place], [place] - [address] [facility]  [address] [facility] [address]  Phone: [phone] Fax: [phone]    [facility] - [place], [place] - [address] [facility]  [address] [facility] [address]  Phone: [phone] Fax: [phone]    [facility]  [address], [address]  [facility] [address]  Phone: [phone] Fax: [phone]    [id number] - [place], [place] [facility] [address] [facility]  [address] [id number]  Phone: [phone] Fax: [phone]      Diabetes  He presents for his follow-up diabetic visit. He has type 2 diabetes mellitus. His disease course has been stable. There are no hypoglycemic associated symptoms. There are no diabetic associated symptoms. There are no hypoglycemic complications. Symptoms are stable. There are no diabetic complications. Risk factors for coronary artery disease include diabetes mellitus, dyslipidemia and hypertension. Current diabetic treatment includes insulin injections. He is compliant with treatment all of the time.     Denies any side effects of diarrhea/constipation and stomach upset. Notices some weight loss.    Time in range: 30 days  Very high: 0%  High: 3%  Time in range: 96%  Low: 1%    Average: 129 mg/dL    Review of Systems    Objective     There were no vitals taken for this visit.     LAB  Lab Results   Component Value Date    BILITOT 0.5 09/09/2024    CALCIUM  "9.9 09/09/2024    CO2 28 09/09/2024    CL 98 09/09/2024    CREATININE 1.14 09/09/2024    GLUCOSE 385 (H) 09/09/2024    ALKPHOS 141 (H) 09/09/2024    K 4.4 09/09/2024    PROT 8.0 09/09/2024     09/09/2024    AST 19 09/09/2024    ALT 29 09/09/2024    BUN 14 09/09/2024    ANIONGAP 14 09/09/2024    MG 1.90 12/27/2021    PHOS 4.1 01/14/2021    ALBUMIN 4.7 09/09/2024    LIPASE 49 12/26/2021    GFRMALE >90 05/15/2023     Lab Results   Component Value Date    TRIG 332 (H) 09/09/2024    CHOL 184 09/09/2024    LDLCALC 71 09/09/2024    HDL 46.4 09/09/2024     Lab Results   Component Value Date    HGBA1C 14 (A) 09/09/2024       Current Outpatient Medications on File Prior to Visit   Medication Sig Dispense Refill    alcohol swabs (Alcohol Pads) pads, medicated Apply 1 Pad topically 3 times a day. 100 each 2    FreeStyle Test strip 1 strip 3 times a day. 100 strip 3    miconazole (Micatin) 2 % cream Apply topically 2 times a day. 57 g 0    [DISCONTINUED] BD Ultra-Fine Mini Pen Needle 31 gauge x 3/16\" needle Use to inject insulin once a night 90 each 3    [DISCONTINUED] cholecalciferol (Vitamin D-3) 25 MCG (1000 UT) tablet Take 1 tablet (1,000 Units) by mouth once daily. 90 tablet 3    [DISCONTINUED] dapagliflozin propanediol (Farxiga) 10 mg Take 1 tablet (10 mg) by mouth once daily. 90 tablet 0    [DISCONTINUED] Dexcom G7 Sensor device Use to monitor blood sugar. Change sensor every 10 days. 3 each 2    [DISCONTINUED] insulin glargine (Lantus) 100 unit/mL (3 mL) pen Inject 47 Units under the skin once daily at bedtime. Take as directed per insulin instructions. 5 each 1    [DISCONTINUED] tirzepatide (Mounjaro) 7.5 mg/0.5 mL pen injector Inject 7.5 mg under the skin 1 (one) time per week. 2 mL 3    [DISCONTINUED] triamterene-hydrochlorothiazid (Dyazide) 37.5-25 mg capsule Take 1 capsule by mouth once daily. 90 capsule 0     No current facility-administered medications on file prior to visit.        HISTORICAL " PHARMACOTHERAPY  -none    DRUG INTERACTIONS  - none    SECONDARY PREVENTION  - Statin? yes  - ACE-I/ARB? yes    Assessment/Plan   Problem List Items Addressed This Visit       Type 2 diabetes mellitus - Primary     INCREASE dose of Mounjaro to 10mg subcutaneous once weekly  Decrease Lantus to 44 units nightly  Educate on side effects of dose increase  Reinforce patient that to maximize weight loss and prevent return weight gain, patient has to combine GLP-1 with good diet and exercise  DASH Diet  150 mins per week of exercise  FUV in 7 weeks regarding side effects and dose titration         Relevant Medications    tirzepatide (Mounjaro) 10 mg/0.5 mL pen injector    Other Relevant Orders    Referral to Clinical Pharmacy        Continue all meds under the continuation of care with the referring provider and clinical pharmacy team.    Dimas Gama PharmD     Verbal consent to manage patient's drug therapy was obtained from [the patient and/or an individual authorized to act on behalf of a patient]. They were informed they may decline to participate or withdraw from participation in pharmacy services at any time.

## 2024-11-04 NOTE — PROGRESS NOTES
"Subjective   Patient ID: Darshan Barroso is a 39 y.o. male who presents for Follow-up (Received flu shot at work).  HPI  Mr. Barroso is a pleasant 39 year old male with PMH of IDDM type 2, HTN, HLD, vit D def, who presents today for follow up.      DM: Farxiga 10mg, mounjaro 7.5mg, lantus 50U nightly  HTN: Triamterene/hydrochlorothiazide 37.5-25mg  HLD: not on statin, LDL 15.   Vit D supplement: 1000U daily supplement     Due to see ophthalmology, will schedule appointment   All systems have been reviewed and are negative for complaint other than those mentioned in the HPI.     Objective   /76 (BP Location: Left arm, Patient Position: Sitting, BP Cuff Size: Large adult)   Ht 1.778 m (5' 10\")   Wt 119 kg (263 lb)   BMI 37.74 kg/m²    Physical Exam  Constitutional:       General: He is awake.      Appearance: Normal appearance.   HENT:      Head: Normocephalic and atraumatic.   Eyes:      Extraocular Movements: Extraocular movements intact.      Pupils: Pupils are equal, round, and reactive to light.   Cardiovascular:      Rate and Rhythm: Normal rate and regular rhythm.      Heart sounds: S1 normal and S2 normal. No murmur heard.  Pulmonary:      Effort: Pulmonary effort is normal.      Breath sounds: Normal breath sounds.   Musculoskeletal:      Cervical back: Normal range of motion and neck supple.      Right lower leg: No edema.      Left lower leg: No edema.   Skin:     General: Skin is warm and dry.   Neurological:      General: No focal deficit present.      Mental Status: He is alert and oriented to person, place, and time.   Psychiatric:         Mood and Affect: Mood and affect normal.         Behavior: Behavior normal. Behavior is cooperative.         Thought Content: Thought content normal.         Judgment: Judgment normal.     Darshan was seen today for follow-up.  Diagnoses and all orders for this visit:  Type 2 diabetes mellitus without complication, with long-term current use of insulin " "(Multi) (Primary)  -     CGM data reviewed, 99% time in range, minimal highs, denies lows  - Continue current medication   - Plan to titrate up on mounjaro and titrate down on insulin as able  - Due for A1C in 1 month, patient to stop by lab to have it drawn   - Medication refilled  - Encouragement given, keep up the good work!   - Hemoglobin A1C; Future  -     Dexcom G7 Sensor device; Use to monitor blood sugar. Change sensor every 10 days.  -     dapagliflozin propanediol (Farxiga) 10 mg; Take 1 tablet (10 mg) by mouth once daily.  -     insulin glargine (Lantus) 100 unit/mL (3 mL) pen; Inject 50 Units under the skin once daily at bedtime. Take as directed per insulin instructions.  -     BD Ultra-Fine Mini Pen Needle 31 gauge x 3/16\" needle; Use to inject insulin once a night  Primary hypertension  -     Stable. Continue current management.   - Given DM2 dx, would consider ACE/ARB addition for BP management, however, at goal today and prefers to continue current medication. Will discuss at next visit.   - triamterene-hydrochlorothiazid (Dyazide) 37.5-25 mg capsule; Take 1 capsule by mouth once daily.  Vitamin D deficiency  -     cholecalciferol (Vitamin D-3) 25 MCG (1000 UT) tablet; Take 1 tablet (1,000 Units) by mouth once daily.    Follow up in 2 months.   "

## 2024-11-21 ENCOUNTER — TELEPHONE (OUTPATIENT)
Dept: PHARMACY | Facility: HOSPITAL | Age: 39
End: 2024-11-21
Payer: COMMERCIAL

## 2024-11-21 NOTE — TELEPHONE ENCOUNTER
Patient reached out to [facility] to discuss low blood sugars. Patient had a morning low of 67mg/dL and states his blood sugars have been very well controlled otherwise. Patient will be increasing Mounjaro to 10mg weekly coming up this week, so will discuss decreasing insulin to help minimize any future lows. Patient has been taking 45 units of insulin daily.    Rx Changes:  DECREASE Lantus to 40 units once daily in the morning  INCREASE Mounjaro to 10mg once weekly    Follow up with Clinical Pharmacy Team 1/3/2024    Continue all meds under the continuation of care with the referring provider and clinical pharmacy team.    Please reach out to the Clinical Pharmacy Team if there are any further questions.     Verbal consent to manage patient's drug therapy was obtained from patient. They were informed they may decline to participate or withdraw from participation in pharmacy services at any time.    Soledad Alvarez, PharmD  [phone]

## 2024-11-27 DIAGNOSIS — E11.9 TYPE 2 DIABETES MELLITUS WITHOUT COMPLICATION, WITH LONG-TERM CURRENT USE OF INSULIN (MULTI): ICD-10-CM

## 2024-11-27 DIAGNOSIS — Z79.4 TYPE 2 DIABETES MELLITUS WITHOUT COMPLICATION, WITH LONG-TERM CURRENT USE OF INSULIN (MULTI): ICD-10-CM

## 2024-11-27 RX ORDER — BLOOD-GLUCOSE SENSOR
EACH MISCELLANEOUS
Qty: 3 EACH | Refills: 2 | Status: SHIPPED | OUTPATIENT
Start: 2024-11-27

## 2024-12-02 DIAGNOSIS — E11.9 TYPE 2 DIABETES MELLITUS WITHOUT COMPLICATION, WITH LONG-TERM CURRENT USE OF INSULIN (MULTI): ICD-10-CM

## 2024-12-02 DIAGNOSIS — Z79.4 TYPE 2 DIABETES MELLITUS WITHOUT COMPLICATION, WITH LONG-TERM CURRENT USE OF INSULIN (MULTI): ICD-10-CM

## 2024-12-02 RX ORDER — BLOOD-GLUCOSE SENSOR
EACH MISCELLANEOUS
Qty: 3 EACH | Refills: 2 | Status: SHIPPED | OUTPATIENT
Start: 2024-12-02 | End: 2024-12-03 | Stop reason: SDUPTHER

## 2024-12-03 ENCOUNTER — TELEPHONE (OUTPATIENT)
Dept: PRIMARY CARE | Facility: CLINIC | Age: 39
End: 2024-12-03
Payer: COMMERCIAL

## 2024-12-03 DIAGNOSIS — Z11.1 SCREENING-PULMONARY TB: Primary | ICD-10-CM

## 2024-12-03 DIAGNOSIS — E11.9 TYPE 2 DIABETES MELLITUS WITHOUT COMPLICATION, WITH LONG-TERM CURRENT USE OF INSULIN (MULTI): ICD-10-CM

## 2024-12-03 DIAGNOSIS — Z79.4 TYPE 2 DIABETES MELLITUS WITHOUT COMPLICATION, WITH LONG-TERM CURRENT USE OF INSULIN (MULTI): ICD-10-CM

## 2024-12-03 RX ORDER — BLOOD-GLUCOSE SENSOR
EACH MISCELLANEOUS
Qty: 3 EACH | Refills: 2 | Status: SHIPPED | OUTPATIENT
Start: 2024-12-03

## 2024-12-07 ENCOUNTER — LAB (OUTPATIENT)
Dept: LAB | Facility: LAB | Age: 39
End: 2024-12-07
Payer: COMMERCIAL

## 2024-12-07 DIAGNOSIS — E11.9 TYPE 2 DIABETES MELLITUS WITHOUT COMPLICATION, WITH LONG-TERM CURRENT USE OF INSULIN (MULTI): ICD-10-CM

## 2024-12-07 DIAGNOSIS — Z11.1 SCREENING-PULMONARY TB: ICD-10-CM

## 2024-12-07 DIAGNOSIS — Z79.4 TYPE 2 DIABETES MELLITUS WITHOUT COMPLICATION, WITH LONG-TERM CURRENT USE OF INSULIN (MULTI): ICD-10-CM

## 2024-12-07 PROCEDURE — 36415 COLL VENOUS BLD VENIPUNCTURE: CPT

## 2024-12-09 DIAGNOSIS — Z79.4 TYPE 2 DIABETES MELLITUS WITHOUT COMPLICATION, WITH LONG-TERM CURRENT USE OF INSULIN (MULTI): ICD-10-CM

## 2024-12-09 DIAGNOSIS — E11.9 TYPE 2 DIABETES MELLITUS WITHOUT COMPLICATION, WITH LONG-TERM CURRENT USE OF INSULIN (MULTI): ICD-10-CM

## 2024-12-09 DIAGNOSIS — Z11.1 SCREENING-PULMONARY TB: Primary | ICD-10-CM

## 2024-12-16 ENCOUNTER — TELEPHONE (OUTPATIENT)
Dept: PRIMARY CARE | Facility: CLINIC | Age: 39
End: 2024-12-16
Payer: COMMERCIAL

## 2024-12-16 DIAGNOSIS — Z79.4 TYPE 2 DIABETES MELLITUS WITHOUT COMPLICATION, WITH LONG-TERM CURRENT USE OF INSULIN (MULTI): ICD-10-CM

## 2024-12-16 DIAGNOSIS — E11.9 TYPE 2 DIABETES MELLITUS WITHOUT COMPLICATION, WITH LONG-TERM CURRENT USE OF INSULIN (MULTI): ICD-10-CM

## 2024-12-16 RX ORDER — INSULIN GLARGINE 100 [IU]/ML
40 INJECTION, SOLUTION SUBCUTANEOUS NIGHTLY
Qty: 5 EACH | Refills: 1 | Status: SHIPPED | OUTPATIENT
Start: 2024-12-16 | End: 2025-12-16

## 2024-12-16 RX ORDER — TIRZEPATIDE 10 MG/.5ML
10 INJECTION, SOLUTION SUBCUTANEOUS WEEKLY
Qty: 2 ML | Refills: 3 | Status: SHIPPED | OUTPATIENT
Start: 2024-12-16

## 2024-12-16 RX ORDER — BLOOD-GLUCOSE SENSOR
EACH MISCELLANEOUS
Qty: 3 EACH | Refills: 2 | Status: SHIPPED | OUTPATIENT
Start: 2024-12-16

## 2024-12-16 NOTE — TELEPHONE ENCOUNTER
Having belching and says it smells like eggs.     Pharmacy advised him to discuss with you because of the injections he's taking.

## 2024-12-16 NOTE — TELEPHONE ENCOUNTER
Spoke with patient  Likely due to recent increase in Mounjaro dose  Discussed strategies to decrease belching including small, frequent meals, increase PO fluid intake outside of mealtimes.   Offered to decrease dose.   Patient opted to continue current dose.

## 2025-01-03 ENCOUNTER — APPOINTMENT (OUTPATIENT)
Dept: PHARMACY | Facility: HOSPITAL | Age: 40
End: 2025-01-03
Payer: COMMERCIAL

## 2025-01-03 DIAGNOSIS — E55.9 VITAMIN D DEFICIENCY: ICD-10-CM

## 2025-01-03 DIAGNOSIS — E11.9 TYPE 2 DIABETES MELLITUS WITHOUT COMPLICATION, WITH LONG-TERM CURRENT USE OF INSULIN (MULTI): Primary | ICD-10-CM

## 2025-01-03 DIAGNOSIS — Z79.4 TYPE 2 DIABETES MELLITUS WITHOUT COMPLICATION, WITH LONG-TERM CURRENT USE OF INSULIN (MULTI): Primary | ICD-10-CM

## 2025-01-03 DIAGNOSIS — I10 PRIMARY HYPERTENSION: ICD-10-CM

## 2025-01-03 RX ORDER — TIRZEPATIDE 12.5 MG/.5ML
12.5 INJECTION, SOLUTION SUBCUTANEOUS WEEKLY
Qty: 2 ML | Refills: 3 | Status: SHIPPED | OUTPATIENT
Start: 2025-01-03

## 2025-01-03 RX ORDER — CHOLECALCIFEROL (VITAMIN D3) 25 MCG
1000 TABLET ORAL DAILY
Qty: 90 TABLET | Refills: 0 | Status: SHIPPED | OUTPATIENT
Start: 2025-01-03

## 2025-01-03 RX ORDER — DAPAGLIFLOZIN 10 MG/1
10 TABLET, FILM COATED ORAL DAILY
Qty: 90 TABLET | Refills: 0 | Status: SHIPPED | OUTPATIENT
Start: 2025-01-03 | End: 2025-04-03

## 2025-01-03 RX ORDER — TRIAMTERENE AND HYDROCHLOROTHIAZIDE 37.5; 25 MG/1; MG/1
1 CAPSULE ORAL DAILY
Qty: 90 CAPSULE | Refills: 0 | Status: SHIPPED | OUTPATIENT
Start: 2025-01-03

## 2025-01-03 RX ORDER — BLOOD-GLUCOSE SENSOR
EACH MISCELLANEOUS
Qty: 3 EACH | Refills: 11 | Status: SHIPPED | OUTPATIENT
Start: 2025-01-03

## 2025-01-03 ASSESSMENT — ENCOUNTER SYMPTOMS: DIABETIC ASSOCIATED SYMPTOMS: 0

## 2025-01-03 NOTE — ASSESSMENT & PLAN NOTE
INCREASE dose of Mounjaro to 12.5mg subcutaneous once weekly  Educate on side effects of dose increase  DECREASE Lantus to 35 units  Reinforce patient that to maximize weight loss and prevent return weight gain, patient has to combine GLP-1 with good diet and exercise  DASH Diet  150 mins per week of exercise  FUV in 3 months regarding side effects and dose titration

## 2025-01-03 NOTE — PROGRESS NOTES
[facility] Pharmacy Consult  Darshan Barroso is a 39 y.o. male was referred to Clinical Pharmacy Team for a Pharmacy consult.  The patient was referred for their Diabetes.    Referring Provider: ABIODUN Fontanez-CNP    Subjective   Allergies   Allergen Reactions    Bee Pollen Unknown    Canagliflozin Unknown     Pt went to hospital with ketoacidosis from this Rx    Metformin Diarrhea    Phenylephrine-Guaifenesin Unknown     Lightheadedness       [id number] - [place], [place] - [address] [facility]  [address] [facility] [address]  Phone: [phone] Fax: [phone]    [facility] - [place], [place] - [address] [facility]  [address] [facility] [address]  Phone: [phone] Fax: [phone]    [facility]  [address], [address]  [facility] [address]  Phone: [phone] Fax: [phone]    [id number] - [place], [place] - [address] [facility] [address] [address] [employer]  [address] [id number]  Phone: [phone] Fax: [phone]      Diabetes  He presents for his follow-up diabetic visit. He has type 2 diabetes mellitus. His disease course has been stable. There are no hypoglycemic associated symptoms. There are no diabetic associated symptoms. There are no hypoglycemic complications. Symptoms are stable. There are no diabetic complications. Risk factors for coronary artery disease include diabetes mellitus, dyslipidemia and hypertension. Current diabetic treatment includes insulin injections. He is compliant with treatment all of the time. His home blood glucose trend is decreasing steadily.     Denies nausea, vomiting, constipation, and diarrhea.     Review of Systems    Objective     There were no vitals taken for this visit.     LAB  Lab Results   Component Value Date    BILITOT 0.5 09/09/2024    CALCIUM 9.9 09/09/2024    CO2 28 09/09/2024    CL 98 09/09/2024    CREATININE 1.14 09/09/2024     "GLUCOSE 385 (H) 09/09/2024    ALKPHOS 141 (H) 09/09/2024    K 4.4 09/09/2024    PROT 8.0 09/09/2024     09/09/2024    AST 19 09/09/2024    ALT 29 09/09/2024    BUN 14 09/09/2024    ANIONGAP 14 09/09/2024    MG 1.90 12/27/2021    PHOS 4.1 01/14/2021    ALBUMIN 4.7 09/09/2024    LIPASE 49 12/26/2021    GFRMALE >90 05/15/2023     Lab Results   Component Value Date    TRIG 332 (H) 09/09/2024    CHOL 184 09/09/2024    LDLCALC 71 09/09/2024    HDL 46.4 09/09/2024     Lab Results   Component Value Date    HGBA1C 7.0 (H) 12/10/2024       Current Outpatient Medications on File Prior to Visit   Medication Sig Dispense Refill    alcohol swabs (Alcohol Pads) pads, medicated Apply 1 Pad topically 3 times a day. 100 each 2    BD Ultra-Fine Mini Pen Needle 31 gauge x 3/16\" needle Use to inject insulin once a night 90 each 3    FreeStyle Test strip 1 strip 3 times a day. 100 strip 3    insulin glargine (Lantus) 100 unit/mL (3 mL) pen Inject 40 Units under the skin once daily at bedtime. Take as directed per insulin instructions. 5 each 1    miconazole (Micatin) 2 % cream Apply topically 2 times a day. 57 g 0    [DISCONTINUED] cholecalciferol (Vitamin D-3) 25 MCG (1000 UT) tablet Take 1 tablet (1,000 Units) by mouth once daily. 90 tablet 3    [DISCONTINUED] dapagliflozin propanediol (Farxiga) 10 mg Take 1 tablet (10 mg) by mouth once daily. 90 tablet 0    [DISCONTINUED] Dexcom G7 Sensor device Use to monitor blood sugar. Change sensor every 10 days. 3 each 2    [DISCONTINUED] tirzepatide (Mounjaro) 10 mg/0.5 mL pen injector Inject 10 mg under the skin 1 (one) time per week. 2 mL 3    [DISCONTINUED] triamterene-hydrochlorothiazid (Dyazide) 37.5-25 mg capsule Take 1 capsule by mouth once daily. 90 capsule 0     No current facility-administered medications on file prior to visit.        HISTORICAL PHARMACOTHERAPY  -none    DRUG INTERACTIONS  - none    SECONDARY PREVENTION  - Statin? yes  - ACE-I/ARB? yes    Assessment/Plan "   Problem List Items Addressed This Visit       Hypertension    Relevant Medications    triamterene-hydrochlorothiazid (Dyazide) 37.5-25 mg capsule    Other Relevant Orders    Referral to Clinical Pharmacy    Vitamin D deficiency    Relevant Medications    cholecalciferol (Vitamin D-3) 25 MCG (1000 UT) tablet    Other Relevant Orders    Referral to Clinical Pharmacy    Type 2 diabetes mellitus - Primary     INCREASE dose of Mounjaro to 12.5mg subcutaneous once weekly  Educate on side effects of dose increase  DECREASE Lantus to 35 units  Reinforce patient that to maximize weight loss and prevent return weight gain, patient has to combine GLP-1 with good diet and exercise  DASH Diet  150 mins per week of exercise  FUV in 3 months regarding side effects and dose titration         Relevant Medications    tirzepatide (Mounjaro) 12.5 mg/0.5 mL pen injector    dapagliflozin propanediol (Farxiga) 10 mg    Dexcom G7 Sensor device    Other Relevant Orders    Referral to Clinical Pharmacy        Continue all meds under the continuation of care with the referring provider and clinical pharmacy team.    Dimas Gama PharmD     Verbal consent to manage patient's drug therapy was obtained from [the patient and/or an individual authorized to act on behalf of a patient]. They were informed they may decline to participate or withdraw from participation in pharmacy services at any time.

## 2025-01-06 ENCOUNTER — APPOINTMENT (OUTPATIENT)
Dept: PRIMARY CARE | Facility: CLINIC | Age: 40
End: 2025-01-06
Payer: COMMERCIAL

## 2025-01-07 DIAGNOSIS — E11.9 TYPE 2 DIABETES MELLITUS WITHOUT COMPLICATION, WITH LONG-TERM CURRENT USE OF INSULIN (MULTI): ICD-10-CM

## 2025-01-07 DIAGNOSIS — Z79.4 TYPE 2 DIABETES MELLITUS WITHOUT COMPLICATION, WITH LONG-TERM CURRENT USE OF INSULIN (MULTI): ICD-10-CM

## 2025-01-07 RX ORDER — INSULIN GLARGINE 100 [IU]/ML
40 INJECTION, SOLUTION SUBCUTANEOUS NIGHTLY
Qty: 15 ML | Refills: 1 | Status: SHIPPED | OUTPATIENT
Start: 2025-01-07 | End: 2025-04-07

## 2025-01-27 DIAGNOSIS — E11.9 TYPE 2 DIABETES MELLITUS WITHOUT COMPLICATION, WITH LONG-TERM CURRENT USE OF INSULIN (MULTI): ICD-10-CM

## 2025-01-27 DIAGNOSIS — Z79.4 TYPE 2 DIABETES MELLITUS WITHOUT COMPLICATION, WITH LONG-TERM CURRENT USE OF INSULIN (MULTI): ICD-10-CM

## 2025-01-27 RX ORDER — DAPAGLIFLOZIN 10 MG/1
10 TABLET, FILM COATED ORAL DAILY
Qty: 90 TABLET | Refills: 0 | Status: SHIPPED | OUTPATIENT
Start: 2025-01-27 | End: 2025-04-27

## 2025-01-27 RX ORDER — INSULIN GLARGINE 100 [IU]/ML
40 INJECTION, SOLUTION SUBCUTANEOUS NIGHTLY
Qty: 15 ML | Refills: 1 | Status: SHIPPED | OUTPATIENT
Start: 2025-01-27 | End: 2025-04-27

## 2025-04-04 ENCOUNTER — APPOINTMENT (OUTPATIENT)
Dept: PHARMACY | Facility: HOSPITAL | Age: 40
End: 2025-04-04
Payer: COMMERCIAL

## 2025-05-01 DIAGNOSIS — Z79.4 TYPE 2 DIABETES MELLITUS WITHOUT COMPLICATION, WITH LONG-TERM CURRENT USE OF INSULIN: ICD-10-CM

## 2025-05-01 DIAGNOSIS — E55.9 VITAMIN D DEFICIENCY: ICD-10-CM

## 2025-05-01 DIAGNOSIS — I10 PRIMARY HYPERTENSION: ICD-10-CM

## 2025-05-01 DIAGNOSIS — E11.9 TYPE 2 DIABETES MELLITUS WITHOUT COMPLICATION, WITH LONG-TERM CURRENT USE OF INSULIN: ICD-10-CM

## 2025-05-01 RX ORDER — DAPAGLIFLOZIN 10 MG/1
10 TABLET, FILM COATED ORAL DAILY
Qty: 90 TABLET | Refills: 0 | Status: SHIPPED | OUTPATIENT
Start: 2025-05-01 | End: 2025-07-30

## 2025-05-01 RX ORDER — INSULIN GLARGINE 100 [IU]/ML
40 INJECTION, SOLUTION SUBCUTANEOUS NIGHTLY
Qty: 15 ML | Refills: 1 | Status: SHIPPED | OUTPATIENT
Start: 2025-05-01 | End: 2025-07-30

## 2025-05-01 RX ORDER — CHOLECALCIFEROL (VITAMIN D3) 25 MCG
25 TABLET ORAL DAILY
Qty: 90 TABLET | Refills: 0 | Status: SHIPPED | OUTPATIENT
Start: 2025-05-01

## 2025-05-01 RX ORDER — TIRZEPATIDE 12.5 MG/.5ML
12.5 INJECTION, SOLUTION SUBCUTANEOUS WEEKLY
Qty: 2 ML | Refills: 2 | Status: SHIPPED | OUTPATIENT
Start: 2025-05-01

## 2025-05-01 RX ORDER — TRIAMTERENE AND HYDROCHLOROTHIAZIDE 37.5; 25 MG/1; MG/1
1 CAPSULE ORAL DAILY
Qty: 90 CAPSULE | Refills: 0 | Status: SHIPPED | OUTPATIENT
Start: 2025-05-01

## 2025-05-23 ENCOUNTER — APPOINTMENT (OUTPATIENT)
Dept: PRIMARY CARE | Facility: CLINIC | Age: 40
End: 2025-05-23
Payer: COMMERCIAL

## 2025-06-26 ENCOUNTER — APPOINTMENT (OUTPATIENT)
Dept: PRIMARY CARE | Facility: CLINIC | Age: 40
End: 2025-06-26
Payer: COMMERCIAL